# Patient Record
Sex: MALE | Race: WHITE
[De-identification: names, ages, dates, MRNs, and addresses within clinical notes are randomized per-mention and may not be internally consistent; named-entity substitution may affect disease eponyms.]

---

## 2018-01-19 ENCOUNTER — HOSPITAL ENCOUNTER (INPATIENT)
Dept: HOSPITAL 82 - MS2 | Age: 68
LOS: 2 days | Discharge: HOME | DRG: 202 | End: 2018-01-21
Attending: INTERNAL MEDICINE | Admitting: INTERNAL MEDICINE
Payer: COMMERCIAL

## 2018-01-19 VITALS — BODY MASS INDEX: 44.1 KG/M2 | WEIGHT: 315 LBS | HEIGHT: 71 IN

## 2018-01-19 VITALS — DIASTOLIC BLOOD PRESSURE: 87 MMHG | SYSTOLIC BLOOD PRESSURE: 154 MMHG

## 2018-01-19 VITALS — SYSTOLIC BLOOD PRESSURE: 156 MMHG | DIASTOLIC BLOOD PRESSURE: 81 MMHG

## 2018-01-19 DIAGNOSIS — M19.90: ICD-10-CM

## 2018-01-19 DIAGNOSIS — J20.8: Primary | ICD-10-CM

## 2018-01-19 DIAGNOSIS — E11.9: ICD-10-CM

## 2018-01-19 DIAGNOSIS — G89.29: ICD-10-CM

## 2018-01-19 DIAGNOSIS — I10: ICD-10-CM

## 2018-01-19 DIAGNOSIS — K21.9: ICD-10-CM

## 2018-01-19 DIAGNOSIS — D69.3: ICD-10-CM

## 2018-01-19 DIAGNOSIS — Z98.84: ICD-10-CM

## 2018-01-19 DIAGNOSIS — E66.01: ICD-10-CM

## 2018-01-19 DIAGNOSIS — I48.0: ICD-10-CM

## 2018-01-19 DIAGNOSIS — I25.10: ICD-10-CM

## 2018-01-19 DIAGNOSIS — F19.20: ICD-10-CM

## 2018-01-19 LAB
ALBUMIN SERPL-MCNC: 3.2 G/DL (ref 3.2–5)
ALP SERPL-CCNC: 67 U/L (ref 38–126)
ALT SERPL-CCNC: 29 U/L (ref 11–66)
ANION GAP SERPL CALCULATED.3IONS-SCNC: 15 MMOL/L
AST SERPL-CCNC: 25 U/L (ref 19–48)
BASOPHILS NFR BLD AUTO: 0 % (ref 0–3)
BILIRUB UR QL STRIP.AUTO: NEGATIVE
BUN SERPL-MCNC: 19 MG/DL (ref 8–23)
BUN/CREAT SERPL: 21
CALCIUM SERPL-MCNC: 9.2 MG/DL (ref 8.4–10.2)
CHLORIDE SERPL-SCNC: 106 MMOL/L (ref 95–108)
CLARITY UR: CLEAR
CO2 SERPL-SCNC: 24 MMOL/L (ref 22–30)
COLOR UR AUTO: YELLOW
CREAT SERPL-MCNC: 0.9 MG/DL (ref 0.7–1.3)
EOSINOPHIL NFR BLD AUTO: 6 % (ref 0–8)
ERYTHROCYTE [DISTWIDTH] IN BLOOD BY AUTOMATED COUNT: 13.2 % (ref 11.5–15.5)
GLUCOSE SERPL-MCNC: 273 MG/DL (ref 82–115)
GLUCOSE UR STRIP.AUTO-MCNC: 500 MG/DL
HCT VFR BLD AUTO: 40.6 % (ref 39–50)
HGB BLD-MCNC: 13.5 G/DL (ref 14–18)
HGB UR QL STRIP.AUTO: NEGATIVE
IMM GRANULOCYTES NFR BLD: 0.6 % (ref 0–1)
KETONES UR STRIP.AUTO-MCNC: NEGATIVE MG/DL
LEUKOCYTE ESTERASE UR QL STRIP.AUTO: NEGATIVE
LYMPHOCYTES NFR BLD: 31 % (ref 15–41)
MCH RBC QN AUTO: 31.2 PG  CALC (ref 26–32)
MCHC RBC AUTO-ENTMCNC: 33.3 G/L CALC (ref 32–36)
MCV RBC AUTO: 93.8 FL  CALC (ref 80–100)
MONOCYTES NFR BLD AUTO: 5 % (ref 2–13)
NEUTROPHILS # BLD AUTO: 2.9 THOU/UL (ref 1.82–7.42)
NEUTROPHILS NFR BLD AUTO: 57 % (ref 42–76)
NITRITE UR QL STRIP.AUTO: NEGATIVE
PH UR STRIP.AUTO: 5 [PH] (ref 4.5–8)
PLATELET # BLD AUTO: 70 THOU/UL (ref 130–400)
POTASSIUM SERPL-SCNC: 4.7 MMOL/L (ref 3.5–5.1)
PROT SERPL-MCNC: 6 G/DL (ref 6.3–8.2)
PROT UR QL STRIP.AUTO: NEGATIVE MG/DL
RBC # BLD AUTO: 4.33 MILL/UL (ref 4.7–6.1)
SODIUM SERPL-SCNC: 139 MMOL/L (ref 137–146)
SP GR UR STRIP.AUTO: 1.01
UROBILINOGEN UR QL STRIP.AUTO: 0.2 E.U./DL

## 2018-01-20 VITALS — DIASTOLIC BLOOD PRESSURE: 92 MMHG | SYSTOLIC BLOOD PRESSURE: 160 MMHG

## 2018-01-20 VITALS — DIASTOLIC BLOOD PRESSURE: 76 MMHG | SYSTOLIC BLOOD PRESSURE: 149 MMHG

## 2018-01-20 VITALS — DIASTOLIC BLOOD PRESSURE: 63 MMHG | SYSTOLIC BLOOD PRESSURE: 120 MMHG

## 2018-01-20 VITALS — DIASTOLIC BLOOD PRESSURE: 79 MMHG | SYSTOLIC BLOOD PRESSURE: 143 MMHG

## 2018-01-20 VITALS — SYSTOLIC BLOOD PRESSURE: 128 MMHG | DIASTOLIC BLOOD PRESSURE: 63 MMHG

## 2018-01-20 LAB
CHOLEST SERPL-MCNC: 145 MG/DL (ref 0–199)
CHOLEST/HDLC SERPL: 3.7 {RATIO}
HDLC SERPL-MCNC: 39 MG/DL (ref 40–?)
LDLC SERPL CALC-MCNC: 93 MG/DL
TRIGL SERPL-MCNC: 62 MG/DL (ref 30–149)
VLDLC SERPL CALC-MCNC: 12 MG/DL

## 2018-01-21 VITALS — DIASTOLIC BLOOD PRESSURE: 55 MMHG | SYSTOLIC BLOOD PRESSURE: 127 MMHG

## 2018-01-21 VITALS — DIASTOLIC BLOOD PRESSURE: 58 MMHG | SYSTOLIC BLOOD PRESSURE: 137 MMHG

## 2018-01-21 LAB
ALBUMIN SERPL-MCNC: 3.3 G/DL (ref 3.2–5)
ALP SERPL-CCNC: 70 U/L (ref 38–126)
ALT SERPL-CCNC: 23 U/L (ref 11–66)
ANION GAP SERPL CALCULATED.3IONS-SCNC: 17 MMOL/L
AST SERPL-CCNC: 17 U/L (ref 19–48)
BASOPHILS NFR BLD AUTO: 0 % (ref 0–3)
BUN SERPL-MCNC: 27 MG/DL (ref 8–23)
BUN/CREAT SERPL: 25
CALCIUM SERPL-MCNC: 9.8 MG/DL (ref 8.4–10.2)
CHLORIDE SERPL-SCNC: 107 MMOL/L (ref 95–108)
CO2 SERPL-SCNC: 22 MMOL/L (ref 22–30)
CREAT SERPL-MCNC: 1.1 MG/DL (ref 0.7–1.3)
EOSINOPHIL NFR BLD AUTO: 0 % (ref 0–8)
ERYTHROCYTE [DISTWIDTH] IN BLOOD BY AUTOMATED COUNT: 13.4 % (ref 11.5–15.5)
GLUCOSE SERPL-MCNC: 323 MG/DL (ref 82–115)
HCT VFR BLD AUTO: 39.7 % (ref 39–50)
HGB BLD-MCNC: 13.6 G/DL (ref 14–18)
IMM GRANULOCYTES NFR BLD: 1 % (ref 0–1)
LYMPHOCYTES NFR BLD: 6 % (ref 15–41)
MCH RBC QN AUTO: 31.9 PG  CALC (ref 26–32)
MCHC RBC AUTO-ENTMCNC: 34.3 G/L CALC (ref 32–36)
MCV RBC AUTO: 93 FL  CALC (ref 80–100)
MONOCYTES NFR BLD AUTO: 3 % (ref 2–13)
NEUTROPHILS # BLD AUTO: 9.46 THOU/UL (ref 1.82–7.42)
NEUTROPHILS NFR BLD AUTO: 90 % (ref 42–76)
PLATELET # BLD AUTO: 94 THOU/UL (ref 130–400)
POTASSIUM SERPL-SCNC: 4.6 MMOL/L (ref 3.5–5.1)
PROT SERPL-MCNC: 6 G/DL (ref 6.3–8.2)
RBC # BLD AUTO: 4.27 MILL/UL (ref 4.7–6.1)
SODIUM SERPL-SCNC: 141 MMOL/L (ref 137–146)

## 2018-03-05 ENCOUNTER — HOSPITAL ENCOUNTER (OUTPATIENT)
Dept: HOSPITAL 82 - ED | Age: 68
Setting detail: OBSERVATION
Discharge: NURSING, REHAB OR LONG-TERM CARE | DRG: 309 | End: 2018-03-05
Attending: INTERNAL MEDICINE | Admitting: INTERNAL MEDICINE
Payer: COMMERCIAL

## 2018-03-05 VITALS — SYSTOLIC BLOOD PRESSURE: 117 MMHG | DIASTOLIC BLOOD PRESSURE: 56 MMHG

## 2018-03-05 VITALS — DIASTOLIC BLOOD PRESSURE: 130 MMHG | SYSTOLIC BLOOD PRESSURE: 180 MMHG

## 2018-03-05 VITALS — SYSTOLIC BLOOD PRESSURE: 116 MMHG | DIASTOLIC BLOOD PRESSURE: 60 MMHG

## 2018-03-05 VITALS — DIASTOLIC BLOOD PRESSURE: 77 MMHG | SYSTOLIC BLOOD PRESSURE: 106 MMHG

## 2018-03-05 VITALS — SYSTOLIC BLOOD PRESSURE: 139 MMHG | DIASTOLIC BLOOD PRESSURE: 82 MMHG

## 2018-03-05 VITALS — DIASTOLIC BLOOD PRESSURE: 57 MMHG | SYSTOLIC BLOOD PRESSURE: 151 MMHG

## 2018-03-05 VITALS — BODY MASS INDEX: 44.1 KG/M2 | WEIGHT: 315 LBS | HEIGHT: 71 IN

## 2018-03-05 VITALS — DIASTOLIC BLOOD PRESSURE: 75 MMHG | SYSTOLIC BLOOD PRESSURE: 140 MMHG

## 2018-03-05 VITALS — DIASTOLIC BLOOD PRESSURE: 57 MMHG | SYSTOLIC BLOOD PRESSURE: 101 MMHG

## 2018-03-05 VITALS — DIASTOLIC BLOOD PRESSURE: 74 MMHG | SYSTOLIC BLOOD PRESSURE: 146 MMHG

## 2018-03-05 VITALS — DIASTOLIC BLOOD PRESSURE: 71 MMHG | SYSTOLIC BLOOD PRESSURE: 125 MMHG

## 2018-03-05 DIAGNOSIS — E11.9: ICD-10-CM

## 2018-03-05 DIAGNOSIS — I10: ICD-10-CM

## 2018-03-05 DIAGNOSIS — I44.2: Primary | ICD-10-CM

## 2018-03-05 DIAGNOSIS — M10.9: ICD-10-CM

## 2018-03-05 DIAGNOSIS — F19.20: ICD-10-CM

## 2018-03-05 DIAGNOSIS — K21.9: ICD-10-CM

## 2018-03-05 DIAGNOSIS — I24.9: ICD-10-CM

## 2018-03-05 DIAGNOSIS — E66.01: ICD-10-CM

## 2018-03-05 DIAGNOSIS — F41.1: ICD-10-CM

## 2018-03-05 DIAGNOSIS — G89.29: ICD-10-CM

## 2018-03-05 DIAGNOSIS — M19.90: ICD-10-CM

## 2018-03-05 DIAGNOSIS — D69.3: ICD-10-CM

## 2018-03-05 DIAGNOSIS — I48.0: ICD-10-CM

## 2018-03-05 DIAGNOSIS — I25.10: ICD-10-CM

## 2018-03-05 DIAGNOSIS — Z98.84: ICD-10-CM

## 2018-03-05 LAB
ANION GAP SERPL CALCULATED.3IONS-SCNC: 18 MMOL/L
BASOPHILS NFR BLD AUTO: 1 % (ref 0–3)
BILIRUB UR QL STRIP.AUTO: NEGATIVE
BUN SERPL-MCNC: 18 MG/DL (ref 8–23)
BUN/CREAT SERPL: 16
CHLORIDE SERPL-SCNC: 105 MMOL/L (ref 95–108)
CLARITY UR: CLEAR
CO2 SERPL-SCNC: 23 MMOL/L (ref 22–30)
COLOR UR AUTO: YELLOW
CREAT SERPL-MCNC: 1.2 MG/DL (ref 0.7–1.3)
EOSINOPHIL NFR BLD AUTO: 1 % (ref 0–8)
ERYTHROCYTE [DISTWIDTH] IN BLOOD BY AUTOMATED COUNT: 12.6 % (ref 11.5–15.5)
GLUCOSE UR STRIP.AUTO-MCNC: NEGATIVE MG/DL
HCT VFR BLD AUTO: 47.7 % (ref 39–50)
HGB BLD-MCNC: 16.5 G/DL (ref 14–18)
HGB UR QL STRIP.AUTO: (no result)
IMM GRANULOCYTES NFR BLD: 0.6 % (ref 0–1)
KETONES UR STRIP.AUTO-MCNC: (no result) MG/DL
LEUKOCYTE ESTERASE UR QL STRIP.AUTO: NEGATIVE
LYMPHOCYTES NFR BLD: 15 % (ref 15–41)
MCH RBC QN AUTO: 32.5 PG  CALC (ref 26–32)
MCHC RBC AUTO-ENTMCNC: 34.6 G/L CALC (ref 32–36)
MCV RBC AUTO: 93.9 FL  CALC (ref 80–100)
MONOCYTES NFR BLD AUTO: 5 % (ref 2–13)
NEUTROPHILS # BLD AUTO: 7.39 THOU/UL (ref 1.82–7.42)
NEUTROPHILS NFR BLD AUTO: 78 % (ref 42–76)
NITRITE UR QL STRIP.AUTO: NEGATIVE
PH UR STRIP.AUTO: 6 [PH] (ref 4.5–8)
PLATELET # BLD AUTO: 102 THOU/UL (ref 130–400)
POTASSIUM SERPL-SCNC: 4.3 MMOL/L (ref 3.5–5.1)
PROT UR QL STRIP.AUTO: (no result) MG/DL
RBC # BLD AUTO: 5.08 MILL/UL (ref 4.7–6.1)
SODIUM SERPL-SCNC: 141 MMOL/L (ref 137–146)
SP GR UR STRIP.AUTO: <=1.005
UROBILINOGEN UR QL STRIP.AUTO: 0.2 E.U./DL

## 2018-03-05 PROCEDURE — G0378 HOSPITAL OBSERVATION PER HR: HCPCS

## 2018-03-05 NOTE — NUR
Dr Akins called this writer; orders received to initiate transfer to [facility] with
Dr Berumen's as accepting physician

## 2018-03-05 NOTE — NUR
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
I'm a retired PA"; meal provided as per request

## 2018-03-05 NOTE — NUR
call received from TRUONG Jimenez; bed assignment received; pt to transfer to
ICU bed 249; report to be called to [phone]

## 2018-03-05 NOTE — NUR
NO CHANGES TO RHYTHM AFTER ADMINISTRATION OF ATROPINE 0.5 MG.  REMAINS IN
COMPLETE HEART BLOCK WITH RATE FROM 36-48.  PT. CONTINUES TO DENY SYMPTOMS.

## 2018-03-05 NOTE — NUR
REPORT CALLED TO BREE NURSE, ON MEDSUR. PT ON CARDIAC MONITOR TO [facility]. IV
SITE HEALTHY. TRANSPORTED VIA STRETCHER.

## 2018-03-05 NOTE — NUR
PT ARRIVED FROM ER VIA STRETCHER ACCOMPANIED BY STAFF. IV SITE IS FREE FROM
REDNESS , TELE MONTIOR IN PLACE. NO DISTRESS NOTED. CONTINUE TO OBSERVE AND
MONITOR.

## 2018-03-05 NOTE — NUR
PT. BACK INTO COMPLETE HEART BLOCK AT THIS TIME.  PT. DENIES SYMPTOMS.
PROVIDED WITH URINAL AND WATER AT THIS TIME.  IV FLUIDS CONTINUE TO INFUSE
/HR.

## 2018-03-05 NOTE — NUR
[facility] Barbara Hwang called per LEANNE Messer RN;
transfer info provided; Eri request facesheet and transfer order to be
faxed to [phone];

## 2018-03-05 NOTE — NUR
male pt received to ICU bed 1 from med/surg s/p clinical support via bed in
stable condition; monitoring attachments explained and connected; assessment
completed at this time; pt alert and oriented; denies pain/chest pain or
dizziness; pt admits to feeling "fine"; resp even and unlabored; lungs clear
bilat; skin color wnl; ra; hr irreg; strong pulses; trace edema noted to ble;
hr 40s on monitor; abd soft/distended with bs present; pt admits to last bm
3/4/18; admits to voiding without complication/pain; no urine to inspect at
this time; urinal at bedside; #20 flushed and patent to lac; blood return
noted; staff attempt/encouraged second iv access with pt refusal; pt states
"I'll take my chances of not having it"; plan of care explained including
transfer to [facility]; call light within reach; will continue to monitor

## 2018-03-05 NOTE — NUR
ASSESS,ENT IS COMPLTED: PT HRT RATE WENT TO 38-45 WHILE TALKING WITH PT
TOTALLY ASYMPTOMATIC. BREATH SOUNDS ARE CLEAR. NO C/O SOB, AT THIS TIME. HR IS
REG, PULSES ARE STRONG X4. BP /57. CONTINUE TO OBSERVE AND MONITOR.

## 2018-03-05 NOTE — NUR
HEARD OVERHEAD PAGE OF CLINICAL SUPPORT TEAM TO ROOM 282. ATTEMPTED TO NOTIFY
 HE WAS ENROUTE TO THE HOSPITAL FROM THE OFFICE.  IN THE ROOM.
EKG BEING PERFORMED. DR. OSORIO ARRIVED AND ORDERED MEDICATIONS AT 1740
. THEN WANTED PT TRANSFERRED TO ICU.

## 2018-03-05 NOTE — NUR
Dr Akins at bedside; orders received to cancel transfer to [facility] and pt is to
transfer to [facility] under services of Dr Trevon Christianson; [facility] called per LEANNE Messer RN; [facility] informed to cancel transfer

## 2018-03-05 NOTE — NUR
PT TRANSFERRED TO ICU VIA BED WITH ALL BELONGINGS STARTED TO GIVE REPORT TO
NURSE AND THEN WENT TO GIVE BEDSIDE REPORT. IV SITE REMAINS INTACT.

## 2018-03-05 NOTE — NUR
PT. LEAVES VIA [facility] AT THIS TIME.  REMAINS IN COMPLETE HEART BLOCK.
DENIES SYMPTOMS AT THIS TIME.  HR REMAINS IN THE 40'S.

## 2018-03-05 NOTE — NUR
MD MADE AWARE OF PT. STATUS.  NEW ORDERS RECEIVED.  WILL MEDICATE AS ORDERED
WHEN PHARMACY PROFILES.

## 2019-02-14 ENCOUNTER — HOSPITAL ENCOUNTER (OUTPATIENT)
Dept: HOSPITAL 82 - CT | Age: 69
Discharge: HOME | DRG: 694 | End: 2019-02-14
Attending: INTERNAL MEDICINE
Payer: COMMERCIAL

## 2019-02-14 DIAGNOSIS — N23: Primary | ICD-10-CM

## 2019-02-14 DIAGNOSIS — N43.2: ICD-10-CM

## 2019-02-14 DIAGNOSIS — N20.0: ICD-10-CM

## 2019-06-13 ENCOUNTER — HOSPITAL ENCOUNTER (OUTPATIENT)
Dept: HOSPITAL 82 - ORM | Age: 69
Discharge: HOME | DRG: 711 | End: 2019-06-13
Attending: UROLOGY
Payer: COMMERCIAL

## 2019-06-13 VITALS — BODY MASS INDEX: 44.1 KG/M2 | WEIGHT: 315 LBS | HEIGHT: 71 IN

## 2019-06-13 VITALS — DIASTOLIC BLOOD PRESSURE: 59 MMHG | SYSTOLIC BLOOD PRESSURE: 111 MMHG

## 2019-06-13 DIAGNOSIS — I25.10: ICD-10-CM

## 2019-06-13 DIAGNOSIS — N32.89: ICD-10-CM

## 2019-06-13 DIAGNOSIS — R39.12: ICD-10-CM

## 2019-06-13 DIAGNOSIS — R35.1: ICD-10-CM

## 2019-06-13 DIAGNOSIS — Z87.442: ICD-10-CM

## 2019-06-13 DIAGNOSIS — I10: ICD-10-CM

## 2019-06-13 DIAGNOSIS — N40.1: Primary | ICD-10-CM

## 2019-06-13 DIAGNOSIS — E66.01: ICD-10-CM

## 2019-06-13 DIAGNOSIS — D69.3: ICD-10-CM

## 2019-06-13 DIAGNOSIS — N43.3: ICD-10-CM

## 2019-06-13 DIAGNOSIS — G89.29: ICD-10-CM

## 2019-06-13 LAB
ALBUMIN SERPL-MCNC: 3.5 G/DL (ref 3.2–5)
ALP SERPL-CCNC: 64 U/L (ref 38–126)
ANION GAP SERPL CALCULATED.3IONS-SCNC: 13 MMOL/L
AST SERPL-CCNC: 16 U/L (ref 19–48)
BASOPHILS NFR BLD AUTO: 1 % (ref 0–3)
BUN SERPL-MCNC: 16 MG/DL (ref 8–23)
BUN/CREAT SERPL: 16
CHLORIDE SERPL-SCNC: 108 MMOL/L (ref 95–108)
CO2 SERPL-SCNC: 27 MMOL/L (ref 22–30)
CREAT SERPL-MCNC: 1 MG/DL (ref 0.7–1.3)
EOSINOPHIL NFR BLD AUTO: 3 % (ref 0–8)
ERYTHROCYTE [DISTWIDTH] IN BLOOD BY AUTOMATED COUNT: 12.5 % (ref 11.5–15.5)
HCT VFR BLD AUTO: 43.1 % (ref 39–50)
HGB BLD-MCNC: 14.3 G/DL (ref 14–18)
IMM GRANULOCYTES NFR BLD: 0.9 % (ref 0–5)
LYMPHOCYTES NFR BLD: 19 % (ref 15–41)
MCH RBC QN AUTO: 31.5 PG  CALC (ref 26–32)
MCHC RBC AUTO-ENTMCNC: 33.2 G/L CALC (ref 32–36)
MCV RBC AUTO: 94.9 FL  CALC (ref 80–100)
MONOCYTES NFR BLD AUTO: 6 % (ref 2–13)
NEUTROPHILS # BLD AUTO: 4.81 THOU/UL (ref 1.82–7.42)
NEUTROPHILS NFR BLD AUTO: 71 % (ref 42–76)
PLATELET # BLD AUTO: 88 THOU/UL (ref 130–400)
POTASSIUM SERPL-SCNC: 4.6 MMOL/L (ref 3.5–5.1)
PROT SERPL-MCNC: 6.1 G/DL (ref 6.3–8.2)
RBC # BLD AUTO: 4.54 MILL/UL (ref 4.7–6.1)
SODIUM SERPL-SCNC: 143 MMOL/L (ref 137–146)

## 2019-06-13 PROCEDURE — 0VBG0ZZ EXCISION OF LEFT SPERMATIC CORD, OPEN APPROACH: ICD-10-PCS | Performed by: UROLOGY

## 2019-06-13 PROCEDURE — 0T7D8DZ DILATION OF URETHRA WITH INTRALUMINAL DEVICE, VIA NATURAL OR ARTIFICIAL OPENING ENDOSCOPIC: ICD-10-PCS | Performed by: UROLOGY

## 2019-06-17 ENCOUNTER — HOSPITAL ENCOUNTER (OUTPATIENT)
Dept: HOSPITAL 82 - MS2 | Age: 69
Setting detail: OBSERVATION
LOS: 2 days | Discharge: HOME | DRG: 920 | End: 2019-06-19
Attending: INTERNAL MEDICINE | Admitting: INTERNAL MEDICINE
Payer: COMMERCIAL

## 2019-06-17 VITALS — HEIGHT: 71 IN | BODY MASS INDEX: 44.1 KG/M2 | WEIGHT: 315 LBS

## 2019-06-17 VITALS — DIASTOLIC BLOOD PRESSURE: 54 MMHG | SYSTOLIC BLOOD PRESSURE: 153 MMHG

## 2019-06-17 VITALS — DIASTOLIC BLOOD PRESSURE: 80 MMHG | SYSTOLIC BLOOD PRESSURE: 120 MMHG

## 2019-06-17 DIAGNOSIS — I10: ICD-10-CM

## 2019-06-17 DIAGNOSIS — D69.3: ICD-10-CM

## 2019-06-17 DIAGNOSIS — M19.90: ICD-10-CM

## 2019-06-17 DIAGNOSIS — K21.9: ICD-10-CM

## 2019-06-17 DIAGNOSIS — Z87.442: ICD-10-CM

## 2019-06-17 DIAGNOSIS — I44.2: ICD-10-CM

## 2019-06-17 DIAGNOSIS — N99.840: Primary | ICD-10-CM

## 2019-06-17 DIAGNOSIS — R39.12: ICD-10-CM

## 2019-06-17 DIAGNOSIS — N40.1: ICD-10-CM

## 2019-06-17 DIAGNOSIS — G89.29: ICD-10-CM

## 2019-06-17 DIAGNOSIS — N43.3: ICD-10-CM

## 2019-06-17 DIAGNOSIS — R35.1: ICD-10-CM
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
## 2019-06-17 DIAGNOSIS — F19.20: ICD-10-CM

## 2019-06-17 DIAGNOSIS — I25.10: ICD-10-CM

## 2019-06-17 DIAGNOSIS — E66.01: ICD-10-CM

## 2019-06-17 DIAGNOSIS — I48.0: ICD-10-CM

## 2019-06-17 DIAGNOSIS — E11.9: ICD-10-CM

## 2019-06-17 DIAGNOSIS — Z95.0: ICD-10-CM

## 2019-06-17 DIAGNOSIS — E78.5: ICD-10-CM

## 2019-06-17 LAB
ANION GAP SERPL CALCULATED.3IONS-SCNC: 11 MMOL/L
BASOPHILS NFR BLD AUTO: 0 % (ref 0–3)
BILIRUB UR QL STRIP.AUTO: NEGATIVE
BUN SERPL-MCNC: 18 MG/DL (ref 8–23)
BUN/CREAT SERPL: 16
CHLORIDE SERPL-SCNC: 104 MMOL/L (ref 95–108)
CO2 SERPL-SCNC: 28 MMOL/L (ref 22–30)
COLOR UR AUTO: (no result)
CREAT SERPL-MCNC: 1.1 MG/DL (ref 0.7–1.3)
EOSINOPHIL NFR BLD AUTO: 3 % (ref 0–8)
ERYTHROCYTE [DISTWIDTH] IN BLOOD BY AUTOMATED COUNT: 12.3 % (ref 11.5–15.5)
GLUCOSE UR STRIP.AUTO-MCNC: NEGATIVE MG/DL
HCT VFR BLD AUTO: 33.7 % (ref 39–50)
HGB BLD-MCNC: 11.3 G/DL (ref 14–18)
HGB UR QL STRIP.AUTO: (no result)
IMM GRANULOCYTES NFR BLD: 0.6 % (ref 0–5)
KETONES UR STRIP.AUTO-MCNC: (no result) MG/DL
LEUKOCYTE ESTERASE UR QL STRIP.AUTO: NEGATIVE
LYMPHOCYTES NFR BLD: 20 % (ref 15–41)
MCH RBC QN AUTO: 32.1 PG  CALC (ref 26–32)
MCHC RBC AUTO-ENTMCNC: 33.5 G/L CALC (ref 32–36)
MCV RBC AUTO: 95.7 FL  CALC (ref 80–100)
MONOCYTES NFR BLD AUTO: 8 % (ref 2–13)
NEUTROPHILS # BLD AUTO: 4.88 THOU/UL (ref 1.82–7.42)
NEUTROPHILS NFR BLD AUTO: 68 % (ref 42–76)
NITRITE UR QL STRIP.AUTO: POSITIVE
PH UR STRIP.AUTO: 5 [PH] (ref 4.5–8)
PLATELET # BLD AUTO: 99 THOU/UL (ref 130–400)
POTASSIUM SERPL-SCNC: 4.5 MMOL/L (ref 3.5–5.1)
PROT UR QL STRIP.AUTO: 100 MG/DL
RBC # BLD AUTO: 3.52 MILL/UL (ref 4.7–6.1)
RBC #/AREA URNS HPF: (no result) RBC/HPF (ref 0–5)
SODIUM SERPL-SCNC: 140 MMOL/L (ref 137–146)
SP GR UR STRIP.AUTO: >=1.03
UROBILINOGEN UR QL STRIP.AUTO: 2 E.U./DL
WBC #/AREA URNS HPF: (no result) WBC/HPF (ref 0–5)

## 2019-06-17 PROCEDURE — G0379 DIRECT REFER HOSPITAL OBSERV: HCPCS

## 2019-06-17 PROCEDURE — G0378 HOSPITAL OBSERVATION PER HR: HCPCS

## 2019-06-18 VITALS — SYSTOLIC BLOOD PRESSURE: 122 MMHG | DIASTOLIC BLOOD PRESSURE: 80 MMHG

## 2019-06-18 VITALS — SYSTOLIC BLOOD PRESSURE: 117 MMHG | DIASTOLIC BLOOD PRESSURE: 41 MMHG

## 2019-06-18 VITALS — SYSTOLIC BLOOD PRESSURE: 132 MMHG | DIASTOLIC BLOOD PRESSURE: 74 MMHG

## 2019-06-18 VITALS — SYSTOLIC BLOOD PRESSURE: 127 MMHG | DIASTOLIC BLOOD PRESSURE: 83 MMHG

## 2019-06-18 LAB
ALBUMIN SERPL-MCNC: 3 G/DL (ref 3.2–5)
ALP SERPL-CCNC: 64 U/L (ref 38–126)
ANION GAP SERPL CALCULATED.3IONS-SCNC: 11 MMOL/L
AST SERPL-CCNC: 14 U/L (ref 19–48)
BASOPHILS NFR BLD AUTO: 1 % (ref 0–3)
BUN SERPL-MCNC: 18 MG/DL (ref 8–23)
BUN/CREAT SERPL: 16
CHLORIDE SERPL-SCNC: 105 MMOL/L (ref 95–108)
CO2 SERPL-SCNC: 27 MMOL/L (ref 22–30)
CREAT SERPL-MCNC: 1.1 MG/DL (ref 0.7–1.3)
EOSINOPHIL NFR BLD AUTO: 4 % (ref 0–8)
ERYTHROCYTE [DISTWIDTH] IN BLOOD BY AUTOMATED COUNT: 12.3 % (ref 11.5–15.5)
HCT VFR BLD AUTO: 33.7 % (ref 39–50)
HGB BLD-MCNC: 11.1 G/DL (ref 14–18)
IMM GRANULOCYTES NFR BLD: 0.6 % (ref 0–5)
LYMPHOCYTES NFR BLD: 21 % (ref 15–41)
MCH RBC QN AUTO: 31.7 PG  CALC (ref 26–32)
MCHC RBC AUTO-ENTMCNC: 32.9 G/L CALC (ref 32–36)
MCV RBC AUTO: 96.3 FL  CALC (ref 80–100)
MONOCYTES NFR BLD AUTO: 7 % (ref 2–13)
NEUTROPHILS # BLD AUTO: 4.64 THOU/UL (ref 1.82–7.42)
NEUTROPHILS NFR BLD AUTO: 66 % (ref 42–76)
PLATELET # BLD AUTO: 95 THOU/UL (ref 130–400)
POTASSIUM SERPL-SCNC: 4.3 MMOL/L (ref 3.5–5.1)
PROT SERPL-MCNC: 5.4 G/DL (ref 6.3–8.2)
RBC # BLD AUTO: 3.5 MILL/UL (ref 4.7–6.1)
SODIUM SERPL-SCNC: 139 MMOL/L (ref 137–146)

## 2019-06-19 VITALS — DIASTOLIC BLOOD PRESSURE: 96 MMHG | SYSTOLIC BLOOD PRESSURE: 181 MMHG

## 2019-06-19 VITALS — DIASTOLIC BLOOD PRESSURE: 74 MMHG | SYSTOLIC BLOOD PRESSURE: 160 MMHG

## 2019-06-19 VITALS — DIASTOLIC BLOOD PRESSURE: 79 MMHG | SYSTOLIC BLOOD PRESSURE: 126 MMHG

## 2019-06-19 LAB
ALBUMIN SERPL-MCNC: 2.9 G/DL (ref 3.2–5)
ALP SERPL-CCNC: 63 U/L (ref 38–126)
ANION GAP SERPL CALCULATED.3IONS-SCNC: 10 MMOL/L
AST SERPL-CCNC: 17 U/L (ref 19–48)
BASOPHILS NFR BLD AUTO: 1 % (ref 0–3)
BUN SERPL-MCNC: 13 MG/DL (ref 8–23)
BUN/CREAT SERPL: 13
CHLORIDE SERPL-SCNC: 107 MMOL/L (ref 95–108)
CO2 SERPL-SCNC: 26 MMOL/L (ref 22–30)
CREAT SERPL-MCNC: 1 MG/DL (ref 0.7–1.3)
EOSINOPHIL NFR BLD AUTO: 4 % (ref 0–8)
ERYTHROCYTE [DISTWIDTH] IN BLOOD BY AUTOMATED COUNT: 12.4 % (ref 11.5–15.5)
HCT VFR BLD AUTO: 34.3 % (ref 39–50)
HGB BLD-MCNC: 11.3 G/DL (ref 14–18)
IMM GRANULOCYTES NFR BLD: 0.5 % (ref 0–5)
LYMPHOCYTES NFR BLD: 24 % (ref 15–41)
MCH RBC QN AUTO: 31.9 PG  CALC (ref 26–32)
MCHC RBC AUTO-ENTMCNC: 32.9 G/L CALC (ref 32–36)
MCV RBC AUTO: 96.9 FL  CALC (ref 80–100)
MONOCYTES NFR BLD AUTO: 9 % (ref 2–13)
NEUTROPHILS # BLD AUTO: 3.98 THOU/UL (ref 1.82–7.42)
NEUTROPHILS NFR BLD AUTO: 62 % (ref 42–76)
PLATELET # BLD AUTO: 92 THOU/UL (ref 130–400)
POTASSIUM SERPL-SCNC: 4.1 MMOL/L (ref 3.5–5.1)
PROT SERPL-MCNC: 5.4 G/DL (ref 6.3–8.2)
RBC # BLD AUTO: 3.54 MILL/UL (ref 4.7–6.1)
SODIUM SERPL-SCNC: 140 MMOL/L (ref 137–146)

## 2019-07-08 ENCOUNTER — HOSPITAL ENCOUNTER (EMERGENCY)
Dept: HOSPITAL 82 - ED | Age: 69
Discharge: HOME | DRG: 730 | End: 2019-07-08
Payer: COMMERCIAL

## 2019-07-08 VITALS — SYSTOLIC BLOOD PRESSURE: 147 MMHG | DIASTOLIC BLOOD PRESSURE: 71 MMHG

## 2019-07-08 VITALS — WEIGHT: 315 LBS | BODY MASS INDEX: 44.1 KG/M2 | HEIGHT: 71 IN

## 2019-07-08 DIAGNOSIS — N45.1: ICD-10-CM

## 2019-07-08 DIAGNOSIS — N50.89: Primary | ICD-10-CM

## 2019-07-08 DIAGNOSIS — Z98.890: ICD-10-CM

## 2019-07-08 LAB
ALBUMIN SERPL-MCNC: 3.7 G/DL (ref 3.2–5)
ALP SERPL-CCNC: 98 U/L (ref 38–126)
ANION GAP SERPL CALCULATED.3IONS-SCNC: 13 MMOL/L
AST SERPL-CCNC: 19 U/L (ref 19–48)
BACTERIA #/AREA URNS HPF: (no result) HPF
BASOPHILS NFR BLD AUTO: 1 % (ref 0–3)
BILIRUB UR QL STRIP.AUTO: NEGATIVE
BUN SERPL-MCNC: 18 MG/DL (ref 8–23)
BUN/CREAT SERPL: 18
CHLORIDE SERPL-SCNC: 106 MMOL/L (ref 95–108)
CO2 SERPL-SCNC: 28 MMOL/L (ref 22–30)
COLOR UR AUTO: YELLOW
CREAT SERPL-MCNC: 1 MG/DL (ref 0.7–1.3)
EOSINOPHIL NFR BLD AUTO: 3 % (ref 0–8)
ERYTHROCYTE [DISTWIDTH] IN BLOOD BY AUTOMATED COUNT: 12.1 % (ref 11.5–15.5)
GLUCOSE UR STRIP.AUTO-MCNC: NEGATIVE MG/DL
HCT VFR BLD AUTO: 40.4 % (ref 39–50)
HGB BLD-MCNC: 13.2 G/DL (ref 14–18)
HGB UR QL STRIP.AUTO: (no result)
IMM GRANULOCYTES NFR BLD: 0.7 % (ref 0–5)
KETONES UR STRIP.AUTO-MCNC: NEGATIVE MG/DL
LEUKOCYTE ESTERASE UR QL STRIP.AUTO: NEGATIVE
LYMPHOCYTES NFR BLD: 20 % (ref 15–41)
MCH RBC QN AUTO: 31 PG  CALC (ref 26–32)
MCHC RBC AUTO-ENTMCNC: 32.7 G/L CALC (ref 32–36)
MCV RBC AUTO: 94.8 FL  CALC (ref 80–100)
MONOCYTES NFR BLD AUTO: 6 % (ref 2–13)
NEUTROPHILS # BLD AUTO: 6.17 THOU/UL (ref 1.82–7.42)
NEUTROPHILS NFR BLD AUTO: 70 % (ref 42–76)
NITRITE UR QL STRIP.AUTO: POSITIVE
PH UR STRIP.AUTO: 7 [PH] (ref 4.5–8)
PLATELET # BLD AUTO: 111 THOU/UL (ref 130–400)
POTASSIUM SERPL-SCNC: 4.7 MMOL/L (ref 3.5–5.1)
PROT SERPL-MCNC: 6.8 G/DL (ref 6.3–8.2)
PROT UR QL STRIP.AUTO: (no result) MG/DL
RBC # BLD AUTO: 4.26 MILL/UL (ref 4.7–6.1)
RBC #/AREA URNS HPF: (no result) RBC/HPF (ref 0–5)
SODIUM SERPL-SCNC: 142 MMOL/L (ref 137–146)
SP GR UR STRIP.AUTO: 1.01
UROBILINOGEN UR QL STRIP.AUTO: 0.2 E.U./DL

## 2019-07-24 ENCOUNTER — HOSPITAL ENCOUNTER (INPATIENT)
Dept: HOSPITAL 82 - ED | Age: 69
LOS: 8 days | Discharge: HOME HEALTH | DRG: 982 | End: 2019-08-01
Attending: INTERNAL MEDICINE | Admitting: INTERNAL MEDICINE
Payer: COMMERCIAL

## 2019-07-24 VITALS — SYSTOLIC BLOOD PRESSURE: 124 MMHG | DIASTOLIC BLOOD PRESSURE: 68 MMHG

## 2019-07-24 VITALS — WEIGHT: 315 LBS | BODY MASS INDEX: 44.1 KG/M2 | HEIGHT: 71 IN

## 2019-07-24 DIAGNOSIS — E78.5: ICD-10-CM

## 2019-07-24 DIAGNOSIS — Z95.0: ICD-10-CM

## 2019-07-24 DIAGNOSIS — D62: ICD-10-CM

## 2019-07-24 DIAGNOSIS — F19.20: ICD-10-CM
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
## 2019-07-24 DIAGNOSIS — N20.0: ICD-10-CM

## 2019-07-24 DIAGNOSIS — E11.9: ICD-10-CM

## 2019-07-24 DIAGNOSIS — N40.1: ICD-10-CM

## 2019-07-24 DIAGNOSIS — I10: ICD-10-CM

## 2019-07-24 DIAGNOSIS — G89.29: ICD-10-CM

## 2019-07-24 DIAGNOSIS — E66.01: ICD-10-CM

## 2019-07-24 DIAGNOSIS — Z98.84: ICD-10-CM

## 2019-07-24 DIAGNOSIS — R35.1: ICD-10-CM

## 2019-07-24 DIAGNOSIS — N45.1: ICD-10-CM

## 2019-07-24 DIAGNOSIS — B96.4: ICD-10-CM

## 2019-07-24 DIAGNOSIS — I25.10: ICD-10-CM

## 2019-07-24 DIAGNOSIS — N99.840: Primary | ICD-10-CM

## 2019-07-24 DIAGNOSIS — F32.9: ICD-10-CM

## 2019-07-24 DIAGNOSIS — R39.12: ICD-10-CM

## 2019-07-24 DIAGNOSIS — K21.9: ICD-10-CM

## 2019-07-24 DIAGNOSIS — F41.9: ICD-10-CM

## 2019-07-24 DIAGNOSIS — M19.90: ICD-10-CM

## 2019-07-24 DIAGNOSIS — D69.3: ICD-10-CM

## 2019-07-24 DIAGNOSIS — I44.2: ICD-10-CM

## 2019-07-24 DIAGNOSIS — I48.0: ICD-10-CM

## 2019-07-24 DIAGNOSIS — N49.2: ICD-10-CM

## 2019-07-24 DIAGNOSIS — T81.43XA: ICD-10-CM

## 2019-07-24 LAB
ALBUMIN SERPL-MCNC: 4 G/DL (ref 3.2–5)
ALP SERPL-CCNC: 83 U/L (ref 38–126)
ANION GAP SERPL CALCULATED.3IONS-SCNC: 17 MMOL/L
AST SERPL-CCNC: 25 U/L (ref 19–48)
BASOPHILS NFR BLD AUTO: 1 % (ref 0–3)
BILIRUB UR QL STRIP.AUTO: NEGATIVE
BUN SERPL-MCNC: 21 MG/DL (ref 8–23)
BUN/CREAT SERPL: 18
CHLORIDE SERPL-SCNC: 105 MMOL/L (ref 95–108)
CO2 SERPL-SCNC: 22 MMOL/L (ref 22–30)
COLOR UR AUTO: YELLOW
CREAT SERPL-MCNC: 1.2 MG/DL (ref 0.7–1.3)
EOSINOPHIL NFR BLD AUTO: 1 % (ref 0–8)
ERYTHROCYTE [DISTWIDTH] IN BLOOD BY AUTOMATED COUNT: 12.2 % (ref 11.5–15.5)
GLUCOSE UR STRIP.AUTO-MCNC: NEGATIVE MG/DL
HCT VFR BLD AUTO: 41.1 % (ref 39–50)
HGB BLD-MCNC: 13.5 G/DL (ref 14–18)
HGB UR QL STRIP.AUTO: (no result)
IMM GRANULOCYTES NFR BLD: 0.6 % (ref 0–5)
KETONES UR STRIP.AUTO-MCNC: NEGATIVE MG/DL
LEUKOCYTE ESTERASE UR QL STRIP.AUTO: NEGATIVE
LYMPHOCYTES NFR BLD: 10 % (ref 15–41)
MCH RBC QN AUTO: 30.8 PG  CALC (ref 26–32)
MCHC RBC AUTO-ENTMCNC: 32.8 G/L CALC (ref 32–36)
MCV RBC AUTO: 93.6 FL  CALC (ref 80–100)
MONOCYTES NFR BLD AUTO: 9 % (ref 2–13)
NEUTROPHILS # BLD AUTO: 10.56 THOU/UL (ref 1.82–7.42)
NEUTROPHILS NFR BLD AUTO: 79 % (ref 42–76)
NITRITE UR QL STRIP.AUTO: NEGATIVE
PH UR STRIP.AUTO: 5.5 [PH] (ref 4.5–8)
PLATELET # BLD AUTO: 144 THOU/UL (ref 130–400)
POTASSIUM SERPL-SCNC: 4.6 MMOL/L (ref 3.5–5.1)
PROT SERPL-MCNC: 7.6 G/DL (ref 6.3–8.2)
PROT UR QL STRIP.AUTO: 30 MG/DL
RBC # BLD AUTO: 4.39 MILL/UL (ref 4.7–6.1)
RBC #/AREA URNS HPF: (no result) RBC/HPF (ref 0–5)
SODIUM SERPL-SCNC: 139 MMOL/L (ref 137–146)
SP GR UR STRIP.AUTO: >=1.03
UROBILINOGEN UR QL STRIP.AUTO: 0.2 E.U./DL
WBC #/AREA URNS HPF: (no result) WBC/HPF (ref 0–5)

## 2019-07-24 PROCEDURE — G0378 HOSPITAL OBSERVATION PER HR: HCPCS

## 2019-07-24 PROCEDURE — P9016 RBC LEUKOCYTES REDUCED: HCPCS

## 2019-07-25 VITALS — DIASTOLIC BLOOD PRESSURE: 50 MMHG | SYSTOLIC BLOOD PRESSURE: 134 MMHG

## 2019-07-25 VITALS — DIASTOLIC BLOOD PRESSURE: 79 MMHG | SYSTOLIC BLOOD PRESSURE: 165 MMHG

## 2019-07-25 VITALS — SYSTOLIC BLOOD PRESSURE: 127 MMHG | DIASTOLIC BLOOD PRESSURE: 48 MMHG

## 2019-07-25 VITALS — DIASTOLIC BLOOD PRESSURE: 62 MMHG | SYSTOLIC BLOOD PRESSURE: 127 MMHG

## 2019-07-25 VITALS — DIASTOLIC BLOOD PRESSURE: 44 MMHG | SYSTOLIC BLOOD PRESSURE: 102 MMHG

## 2019-07-25 VITALS — SYSTOLIC BLOOD PRESSURE: 149 MMHG | DIASTOLIC BLOOD PRESSURE: 66 MMHG

## 2019-07-25 VITALS — DIASTOLIC BLOOD PRESSURE: 52 MMHG | SYSTOLIC BLOOD PRESSURE: 113 MMHG

## 2019-07-25 VITALS — DIASTOLIC BLOOD PRESSURE: 44 MMHG | SYSTOLIC BLOOD PRESSURE: 125 MMHG

## 2019-07-25 VITALS — DIASTOLIC BLOOD PRESSURE: 71 MMHG | SYSTOLIC BLOOD PRESSURE: 125 MMHG

## 2019-07-25 LAB
ALBUMIN SERPL-MCNC: 2.8 G/DL (ref 3.2–5)
ALP SERPL-CCNC: 75 U/L (ref 38–126)
ANION GAP SERPL CALCULATED.3IONS-SCNC: 11 MMOL/L
AST SERPL-CCNC: 20 U/L (ref 19–48)
BASOPHILS NFR BLD AUTO: 1 % (ref 0–3)
BUN SERPL-MCNC: 19 MG/DL (ref 8–23)
BUN/CREAT SERPL: 20
CHLORIDE SERPL-SCNC: 108 MMOL/L (ref 95–108)
CO2 SERPL-SCNC: 25 MMOL/L (ref 22–30)
CREAT SERPL-MCNC: 1 MG/DL (ref 0.7–1.3)
EOSINOPHIL NFR BLD AUTO: 2 % (ref 0–8)
ERYTHROCYTE [DISTWIDTH] IN BLOOD BY AUTOMATED COUNT: 12.3 % (ref 11.5–15.5)
HCT VFR BLD AUTO: 36 % (ref 39–50)
HGB BLD-MCNC: 11.7 G/DL (ref 14–18)
IMM GRANULOCYTES NFR BLD: 0.6 % (ref 0–5)
LYMPHOCYTES NFR BLD: 22 % (ref 15–41)
MCH RBC QN AUTO: 30.8 PG  CALC (ref 26–32)
MCHC RBC AUTO-ENTMCNC: 32.5 G/L CALC (ref 32–36)
MCV RBC AUTO: 94.7 FL  CALC (ref 80–100)
MONOCYTES NFR BLD AUTO: 9 % (ref 2–13)
NEUTROPHILS # BLD AUTO: 5.33 THOU/UL (ref 1.82–7.42)
NEUTROPHILS NFR BLD AUTO: 65 % (ref 42–76)
PLATELET # BLD AUTO: 121 THOU/UL (ref 130–400)
POTASSIUM SERPL-SCNC: 4.2 MMOL/L (ref 3.5–5.1)
PROT SERPL-MCNC: 5.7 G/DL (ref 6.3–8.2)
RBC # BLD AUTO: 3.8 MILL/UL (ref 4.7–6.1)
SODIUM SERPL-SCNC: 140 MMOL/L (ref 137–146)

## 2019-07-25 PROCEDURE — 0VSB0ZZ REPOSITION LEFT TESTIS, OPEN APPROACH: ICD-10-PCS | Performed by: UROLOGY

## 2019-07-25 PROCEDURE — 0V950ZZ DRAINAGE OF SCROTUM, OPEN APPROACH: ICD-10-PCS | Performed by: UROLOGY

## 2019-07-25 PROCEDURE — 0VB50ZZ EXCISION OF SCROTUM, OPEN APPROACH: ICD-10-PCS | Performed by: UROLOGY

## 2019-07-26 VITALS — SYSTOLIC BLOOD PRESSURE: 111 MMHG | DIASTOLIC BLOOD PRESSURE: 58 MMHG

## 2019-07-26 VITALS — DIASTOLIC BLOOD PRESSURE: 68 MMHG | SYSTOLIC BLOOD PRESSURE: 136 MMHG

## 2019-07-26 VITALS — SYSTOLIC BLOOD PRESSURE: 107 MMHG | DIASTOLIC BLOOD PRESSURE: 56 MMHG

## 2019-07-26 VITALS — DIASTOLIC BLOOD PRESSURE: 63 MMHG | SYSTOLIC BLOOD PRESSURE: 111 MMHG

## 2019-07-26 VITALS — DIASTOLIC BLOOD PRESSURE: 50 MMHG | SYSTOLIC BLOOD PRESSURE: 112 MMHG

## 2019-07-26 LAB
ANION GAP SERPL CALCULATED.3IONS-SCNC: 12 MMOL/L
BASOPHILS NFR BLD AUTO: 1 % (ref 0–3)
BUN SERPL-MCNC: 16 MG/DL (ref 8–23)
BUN/CREAT SERPL: 17
CHLORIDE SERPL-SCNC: 108 MMOL/L (ref 95–108)
CO2 SERPL-SCNC: 24 MMOL/L (ref 22–30)
CREAT SERPL-MCNC: 0.9 MG/DL (ref 0.7–1.3)
EOSINOPHIL NFR BLD AUTO: 1 % (ref 0–8)
ERYTHROCYTE [DISTWIDTH] IN BLOOD BY AUTOMATED COUNT: 12.2 % (ref 11.5–15.5)
HCT VFR BLD AUTO: 34.3 % (ref 39–50)
HGB BLD-MCNC: 10.9 G/DL (ref 14–18)
IMM GRANULOCYTES NFR BLD: 0.4 % (ref 0–5)
LYMPHOCYTES NFR BLD: 13 % (ref 15–41)
MCH RBC QN AUTO: 30.8 PG  CALC (ref 26–32)
MCHC RBC AUTO-ENTMCNC: 31.8 G/L CALC (ref 32–36)
MCV RBC AUTO: 96.9 FL  CALC (ref 80–100)
MONOCYTES NFR BLD AUTO: 8 % (ref 2–13)
NEUTROPHILS # BLD AUTO: 6.09 THOU/UL (ref 1.82–7.42)
NEUTROPHILS NFR BLD AUTO: 77 % (ref 42–76)
PLATELET # BLD AUTO: 104 THOU/UL (ref 130–400)
POTASSIUM SERPL-SCNC: 4.7 MMOL/L (ref 3.5–5.1)
RBC # BLD AUTO: 3.54 MILL/UL (ref 4.7–6.1)
SODIUM SERPL-SCNC: 139 MMOL/L (ref 137–146)

## 2019-07-26 PROCEDURE — 05HD33Z INSERTION OF INFUSION DEVICE INTO RIGHT CEPHALIC VEIN, PERCUTANEOUS APPROACH: ICD-10-PCS | Performed by: RADIOLOGY

## 2019-07-26 PROCEDURE — B51MZZA FLUOROSCOPY OF RIGHT UPPER EXTREMITY VEINS, GUIDANCE: ICD-10-PCS | Performed by: RADIOLOGY

## 2019-07-27 VITALS — SYSTOLIC BLOOD PRESSURE: 146 MMHG | DIASTOLIC BLOOD PRESSURE: 84 MMHG

## 2019-07-27 VITALS — DIASTOLIC BLOOD PRESSURE: 66 MMHG | SYSTOLIC BLOOD PRESSURE: 120 MMHG

## 2019-07-27 VITALS — DIASTOLIC BLOOD PRESSURE: 56 MMHG | SYSTOLIC BLOOD PRESSURE: 130 MMHG

## 2019-07-27 VITALS — SYSTOLIC BLOOD PRESSURE: 114 MMHG | DIASTOLIC BLOOD PRESSURE: 66 MMHG

## 2019-07-28 VITALS — DIASTOLIC BLOOD PRESSURE: 63 MMHG | SYSTOLIC BLOOD PRESSURE: 122 MMHG

## 2019-07-28 VITALS — SYSTOLIC BLOOD PRESSURE: 141 MMHG | DIASTOLIC BLOOD PRESSURE: 61 MMHG

## 2019-07-28 VITALS — SYSTOLIC BLOOD PRESSURE: 147 MMHG | DIASTOLIC BLOOD PRESSURE: 62 MMHG

## 2019-07-28 VITALS — SYSTOLIC BLOOD PRESSURE: 152 MMHG | DIASTOLIC BLOOD PRESSURE: 65 MMHG

## 2019-07-28 LAB
ANION GAP SERPL CALCULATED.3IONS-SCNC: 11 MMOL/L
BASOPHILS NFR BLD AUTO: 1 % (ref 0–3)
BUN SERPL-MCNC: 9 MG/DL (ref 8–23)
BUN/CREAT SERPL: 9
CHLORIDE SERPL-SCNC: 106 MMOL/L (ref 95–108)
CO2 SERPL-SCNC: 26 MMOL/L (ref 22–30)
CREAT SERPL-MCNC: 1 MG/DL (ref 0.7–1.3)
EOSINOPHIL NFR BLD AUTO: 4 % (ref 0–8)
ERYTHROCYTE [DISTWIDTH] IN BLOOD BY AUTOMATED COUNT: 12.2 % (ref 11.5–15.5)
HCT VFR BLD AUTO: 28.7 % (ref 39–50)
HGB BLD-MCNC: 9.2 G/DL (ref 14–18)
IMM GRANULOCYTES NFR BLD: 0.7 % (ref 0–5)
LYMPHOCYTES NFR BLD: 23 % (ref 15–41)
MAGNESIUM SERPL-MCNC: 1.6 MG/DL (ref 1.6–2.3)
MCH RBC QN AUTO: 30.8 PG  CALC (ref 26–32)
MCHC RBC AUTO-ENTMCNC: 32.1 G/L CALC (ref 32–36)
MCV RBC AUTO: 96 FL  CALC (ref 80–100)
MONOCYTES NFR BLD AUTO: 9 % (ref 2–13)
NEUTROPHILS # BLD AUTO: 4.76 THOU/UL (ref 1.82–7.42)
NEUTROPHILS NFR BLD AUTO: 62 % (ref 42–76)
PLATELET # BLD AUTO: 119 THOU/UL (ref 130–400)
POTASSIUM SERPL-SCNC: 4.7 MMOL/L (ref 3.5–5.1)
RBC # BLD AUTO: 2.99 MILL/UL (ref 4.7–6.1)
SODIUM SERPL-SCNC: 139 MMOL/L (ref 137–146)

## 2019-07-29 VITALS — DIASTOLIC BLOOD PRESSURE: 68 MMHG | SYSTOLIC BLOOD PRESSURE: 151 MMHG

## 2019-07-29 VITALS — DIASTOLIC BLOOD PRESSURE: 76 MMHG | SYSTOLIC BLOOD PRESSURE: 130 MMHG

## 2019-07-29 VITALS — SYSTOLIC BLOOD PRESSURE: 143 MMHG | DIASTOLIC BLOOD PRESSURE: 90 MMHG

## 2019-07-29 VITALS — SYSTOLIC BLOOD PRESSURE: 146 MMHG | DIASTOLIC BLOOD PRESSURE: 72 MMHG

## 2019-07-29 LAB
ANION GAP SERPL CALCULATED.3IONS-SCNC: 13 MMOL/L
BASOPHILS NFR BLD AUTO: 0 % (ref 0–3)
BUN SERPL-MCNC: 10 MG/DL (ref 8–23)
BUN/CREAT SERPL: 10
CHLORIDE SERPL-SCNC: 104 MMOL/L (ref 95–108)
CO2 SERPL-SCNC: 26 MMOL/L (ref 22–30)
CREAT SERPL-MCNC: 1 MG/DL (ref 0.7–1.3)
EOSINOPHIL NFR BLD AUTO: 4 % (ref 0–8)
ERYTHROCYTE [DISTWIDTH] IN BLOOD BY AUTOMATED COUNT: 12.1 % (ref 11.5–15.5)
HCT VFR BLD AUTO: 29.8 % (ref 39–50)
HGB BLD-MCNC: 9.7 G/DL (ref 14–18)
IMM GRANULOCYTES NFR BLD: 0.6 % (ref 0–5)
LYMPHOCYTES NFR BLD: 19 % (ref 15–41)
MAGNESIUM SERPL-MCNC: 1.7 MG/DL (ref 1.6–2.3)
MCH RBC QN AUTO: 30.9 PG  CALC (ref 26–32)
MCHC RBC AUTO-ENTMCNC: 32.6 G/L CALC (ref 32–36)
MCV RBC AUTO: 94.9 FL  CALC (ref 80–100)
MONOCYTES NFR BLD AUTO: 7 % (ref 2–13)
NEUTROPHILS # BLD AUTO: 6.12 THOU/UL (ref 1.82–7.42)
NEUTROPHILS NFR BLD AUTO: 68 % (ref 42–76)
PLATELET # BLD AUTO: 147 THOU/UL (ref 130–400)
POTASSIUM SERPL-SCNC: 4.3 MMOL/L (ref 3.5–5.1)
RBC # BLD AUTO: 3.14 MILL/UL (ref 4.7–6.1)
SODIUM SERPL-SCNC: 138 MMOL/L (ref 137–146)

## 2019-07-30 VITALS — DIASTOLIC BLOOD PRESSURE: 58 MMHG | SYSTOLIC BLOOD PRESSURE: 148 MMHG

## 2019-07-30 VITALS — SYSTOLIC BLOOD PRESSURE: 138 MMHG | DIASTOLIC BLOOD PRESSURE: 66 MMHG

## 2019-07-30 VITALS — SYSTOLIC BLOOD PRESSURE: 142 MMHG | DIASTOLIC BLOOD PRESSURE: 51 MMHG

## 2019-07-30 VITALS — SYSTOLIC BLOOD PRESSURE: 154 MMHG | DIASTOLIC BLOOD PRESSURE: 66 MMHG

## 2019-07-30 VITALS — SYSTOLIC BLOOD PRESSURE: 119 MMHG | DIASTOLIC BLOOD PRESSURE: 60 MMHG

## 2019-07-30 VITALS — DIASTOLIC BLOOD PRESSURE: 44 MMHG | SYSTOLIC BLOOD PRESSURE: 120 MMHG

## 2019-07-30 VITALS — SYSTOLIC BLOOD PRESSURE: 152 MMHG | DIASTOLIC BLOOD PRESSURE: 66 MMHG

## 2019-07-30 VITALS — SYSTOLIC BLOOD PRESSURE: 143 MMHG | DIASTOLIC BLOOD PRESSURE: 73 MMHG

## 2019-07-30 VITALS — DIASTOLIC BLOOD PRESSURE: 78 MMHG | SYSTOLIC BLOOD PRESSURE: 143 MMHG

## 2019-07-30 VITALS — SYSTOLIC BLOOD PRESSURE: 142 MMHG | DIASTOLIC BLOOD PRESSURE: 77 MMHG

## 2019-07-30 VITALS — DIASTOLIC BLOOD PRESSURE: 73 MMHG | SYSTOLIC BLOOD PRESSURE: 127 MMHG

## 2019-07-30 PROCEDURE — BV44ZZZ ULTRASONOGRAPHY OF SCROTUM: ICD-10-PCS | Performed by: UROLOGY

## 2019-07-30 PROCEDURE — 0VQ50ZZ REPAIR SCROTUM, OPEN APPROACH: ICD-10-PCS | Performed by: UROLOGY

## 2019-07-30 PROCEDURE — 02HV33Z INSERTION OF INFUSION DEVICE INTO SUPERIOR VENA CAVA, PERCUTANEOUS APPROACH: ICD-10-PCS | Performed by: NURSE ANESTHETIST, CERTIFIED REGISTERED

## 2019-07-31 VITALS — DIASTOLIC BLOOD PRESSURE: 81 MMHG | SYSTOLIC BLOOD PRESSURE: 165 MMHG

## 2019-07-31 VITALS — DIASTOLIC BLOOD PRESSURE: 79 MMHG | SYSTOLIC BLOOD PRESSURE: 146 MMHG

## 2019-07-31 VITALS — DIASTOLIC BLOOD PRESSURE: 50 MMHG | SYSTOLIC BLOOD PRESSURE: 134 MMHG

## 2019-07-31 VITALS — SYSTOLIC BLOOD PRESSURE: 140 MMHG | DIASTOLIC BLOOD PRESSURE: 75 MMHG

## 2019-07-31 VITALS — SYSTOLIC BLOOD PRESSURE: 133 MMHG | DIASTOLIC BLOOD PRESSURE: 46 MMHG

## 2019-07-31 VITALS — DIASTOLIC BLOOD PRESSURE: 62 MMHG | SYSTOLIC BLOOD PRESSURE: 126 MMHG

## 2019-07-31 VITALS — DIASTOLIC BLOOD PRESSURE: 68 MMHG | SYSTOLIC BLOOD PRESSURE: 147 MMHG

## 2019-07-31 VITALS — SYSTOLIC BLOOD PRESSURE: 117 MMHG | DIASTOLIC BLOOD PRESSURE: 43 MMHG

## 2019-07-31 VITALS — SYSTOLIC BLOOD PRESSURE: 130 MMHG | DIASTOLIC BLOOD PRESSURE: 69 MMHG

## 2019-07-31 VITALS — DIASTOLIC BLOOD PRESSURE: 75 MMHG | SYSTOLIC BLOOD PRESSURE: 155 MMHG

## 2019-07-31 VITALS — DIASTOLIC BLOOD PRESSURE: 69 MMHG | SYSTOLIC BLOOD PRESSURE: 146 MMHG

## 2019-07-31 LAB
ANION GAP SERPL CALCULATED.3IONS-SCNC: 10 MMOL/L
BASOPHILS NFR BLD AUTO: 0 % (ref 0–3)
BUN SERPL-MCNC: 14 MG/DL (ref 8–23)
BUN/CREAT SERPL: 15
CHLORIDE SERPL-SCNC: 106 MMOL/L (ref 95–108)
CO2 SERPL-SCNC: 26 MMOL/L (ref 22–30)
CREAT SERPL-MCNC: 0.9 MG/DL (ref 0.7–1.3)
EOSINOPHIL NFR BLD AUTO: 4 % (ref 0–8)
ERYTHROCYTE [DISTWIDTH] IN BLOOD BY AUTOMATED COUNT: 11.9 % (ref 11.5–15.5)
HCT VFR BLD AUTO: 26.9 % (ref 39–50)
HGB BLD-MCNC: 8.7 G/DL (ref 14–18)
IMM GRANULOCYTES NFR BLD: 0.5 % (ref 0–5)
LYMPHOCYTES NFR BLD: 20 % (ref 15–41)
MCH RBC QN AUTO: 31 PG  CALC (ref 26–32)
MCHC RBC AUTO-ENTMCNC: 32.3 G/L CALC (ref 32–36)
MCV RBC AUTO: 95.7 FL  CALC (ref 80–100)
MONOCYTES NFR BLD AUTO: 8 % (ref 2–13)
NEUTROPHILS # BLD AUTO: 5.17 THOU/UL (ref 1.82–7.42)
NEUTROPHILS NFR BLD AUTO: 67 % (ref 42–76)
PLATELET # BLD AUTO: 149 THOU/UL (ref 130–400)
POTASSIUM SERPL-SCNC: 4.3 MMOL/L (ref 3.5–5.1)
RBC # BLD AUTO: 2.81 MILL/UL (ref 4.7–6.1)
SODIUM SERPL-SCNC: 138 MMOL/L (ref 137–146)

## 2019-07-31 PROCEDURE — 30233N1 TRANSFUSION OF NONAUTOLOGOUS RED BLOOD CELLS INTO PERIPHERAL VEIN, PERCUTANEOUS APPROACH: ICD-10-PCS | Performed by: INTERNAL MEDICINE

## 2019-08-01 VITALS — SYSTOLIC BLOOD PRESSURE: 175 MMHG | DIASTOLIC BLOOD PRESSURE: 81 MMHG

## 2019-08-01 VITALS — SYSTOLIC BLOOD PRESSURE: 135 MMHG | DIASTOLIC BLOOD PRESSURE: 60 MMHG

## 2019-08-01 LAB
ALBUMIN SERPL-MCNC: 2.6 G/DL (ref 3.2–5)
ALP SERPL-CCNC: 80 U/L (ref 38–126)
ANION GAP SERPL CALCULATED.3IONS-SCNC: 8 MMOL/L
AST SERPL-CCNC: 15 U/L (ref 19–48)
BASOPHILS NFR BLD AUTO: 1 % (ref 0–3)
BUN SERPL-MCNC: 16 MG/DL (ref 8–23)
BUN/CREAT SERPL: 17
CHLORIDE SERPL-SCNC: 106 MMOL/L (ref 95–108)
CO2 SERPL-SCNC: 31 MMOL/L (ref 22–30)
CREAT SERPL-MCNC: 1 MG/DL (ref 0.7–1.3)
EOSINOPHIL NFR BLD AUTO: 5 % (ref 0–8)
ERYTHROCYTE [DISTWIDTH] IN BLOOD BY AUTOMATED COUNT: 12.9 % (ref 11.5–15.5)
HCT VFR BLD AUTO: 30.9 % (ref 39–50)
HGB BLD-MCNC: 9.9 G/DL (ref 14–18)
IMM GRANULOCYTES NFR BLD: 0.9 % (ref 0–5)
LYMPHOCYTES NFR BLD: 26 % (ref 15–41)
MCH RBC QN AUTO: 30.1 PG  CALC (ref 26–32)
MCHC RBC AUTO-ENTMCNC: 32 G/L CALC (ref 32–36)
MCV RBC AUTO: 93.9 FL  CALC (ref 80–100)
MONOCYTES NFR BLD AUTO: 10 % (ref 2–13)
NEUTROPHILS # BLD AUTO: 3.23 THOU/UL (ref 1.82–7.42)
NEUTROPHILS NFR BLD AUTO: 58 % (ref 42–76)
PLATELET # BLD AUTO: 136 THOU/UL (ref 130–400)
POTASSIUM SERPL-SCNC: 4.2 MMOL/L (ref 3.5–5.1)
PROT SERPL-MCNC: 5.7 G/DL (ref 6.3–8.2)
RBC # BLD AUTO: 3.29 MILL/UL (ref 4.7–6.1)
SODIUM SERPL-SCNC: 141 MMOL/L (ref 137–146)

## 2019-12-06 ENCOUNTER — HOSPITAL ENCOUNTER (OUTPATIENT)
Dept: HOSPITAL 82 - ED | Age: 69
Setting detail: OBSERVATION
LOS: 1 days | Discharge: HOME | DRG: 313 | End: 2019-12-07
Attending: INTERNAL MEDICINE | Admitting: INTERNAL MEDICINE
Payer: COMMERCIAL

## 2019-12-06 VITALS — WEIGHT: 315 LBS | BODY MASS INDEX: 44.1 KG/M2 | HEIGHT: 71 IN

## 2019-12-06 VITALS — SYSTOLIC BLOOD PRESSURE: 135 MMHG | DIASTOLIC BLOOD PRESSURE: 78 MMHG

## 2019-12-06 VITALS — DIASTOLIC BLOOD PRESSURE: 98 MMHG | SYSTOLIC BLOOD PRESSURE: 174 MMHG

## 2019-12-06 DIAGNOSIS — I25.10: ICD-10-CM

## 2019-12-06 DIAGNOSIS — K21.9: ICD-10-CM

## 2019-12-06 DIAGNOSIS — I10: ICD-10-CM

## 2019-12-06 DIAGNOSIS — Z98.84: ICD-10-CM

## 2019-12-06 DIAGNOSIS — E66.9: ICD-10-CM

## 2019-12-06 DIAGNOSIS — M19.90: ICD-10-CM

## 2019-12-06 DIAGNOSIS — R42: ICD-10-CM

## 2019-12-06 DIAGNOSIS — E78.5: ICD-10-CM

## 2019-12-06 DIAGNOSIS — D69.3: ICD-10-CM

## 2019-12-06 DIAGNOSIS — Z95.0: ICD-10-CM

## 2019-12-06 DIAGNOSIS — F41.9: ICD-10-CM

## 2019-12-06 DIAGNOSIS — R53.1: ICD-10-CM

## 2019-12-06 DIAGNOSIS — I48.91: ICD-10-CM

## 2019-12-06 DIAGNOSIS — R01.1: ICD-10-CM

## 2019-12-06 DIAGNOSIS — R09.02: ICD-10-CM

## 2019-12-06 DIAGNOSIS — R07.9: Primary | ICD-10-CM

## 2019-12-06 DIAGNOSIS — F32.9: ICD-10-CM

## 2019-12-06 LAB
ALBUMIN SERPL-MCNC: 4.3 G/DL (ref 3.2–5)
ALP SERPL-CCNC: 95 U/L (ref 38–126)
ANION GAP SERPL CALCULATED.3IONS-SCNC: 19 MMOL/L
AST SERPL-CCNC: 61 U/L (ref 19–48)
BASOPHILS NFR BLD AUTO: 1 % (ref 0–3)
BUN SERPL-MCNC: 26 MG/DL (ref 8–23)
BUN/CREAT SERPL: 17
CHLORIDE SERPL-SCNC: 103 MMOL/L (ref 95–108)
CO2 SERPL-SCNC: 21 MMOL/L (ref 22–30)
CREAT SERPL-MCNC: 1.6 MG/DL (ref 0.7–1.3)
EOSINOPHIL NFR BLD AUTO: 1 % (ref 0–8)
ERYTHROCYTE [DISTWIDTH] IN BLOOD BY AUTOMATED COUNT: 13.7 % (ref 11.5–15.5)
HCT VFR BLD AUTO: 47 % (ref 39–50)
HGB BLD-MCNC: 15.5 G/DL (ref 14–18)
IMM GRANULOCYTES NFR BLD: 0.5 % (ref 0–5)
LYMPHOCYTES NFR BLD: 17 % (ref 15–41)
MCH RBC QN AUTO: 30.2 PG  CALC (ref 26–32)
MCHC RBC AUTO-ENTMCNC: 33 G/L CALC (ref 32–36)
MCV RBC AUTO: 91.6 FL  CALC (ref 80–100)
MONOCYTES NFR BLD AUTO: 8 % (ref 2–13)
NEUTROPHILS # BLD AUTO: 8.01 THOU/UL (ref 1.82–7.42)
NEUTROPHILS NFR BLD AUTO: 72 % (ref 42–76)
PLATELET # BLD AUTO: 130 THOU/UL (ref 130–400)
POTASSIUM SERPL-SCNC: 4.6 MMOL/L (ref 3.5–5.1)
PROT SERPL-MCNC: 7.7 G/DL (ref 6.3–8.2)
RBC # BLD AUTO: 5.13 MILL/UL (ref 4.7–6.1)
SODIUM SERPL-SCNC: 138 MMOL/L (ref 137–146)

## 2019-12-06 PROCEDURE — G0378 HOSPITAL OBSERVATION PER HR: HCPCS

## 2019-12-07 VITALS — SYSTOLIC BLOOD PRESSURE: 135 MMHG | DIASTOLIC BLOOD PRESSURE: 82 MMHG

## 2019-12-07 VITALS — DIASTOLIC BLOOD PRESSURE: 71 MMHG | SYSTOLIC BLOOD PRESSURE: 121 MMHG

## 2019-12-07 VITALS — SYSTOLIC BLOOD PRESSURE: 134 MMHG | DIASTOLIC BLOOD PRESSURE: 79 MMHG

## 2019-12-07 LAB
CHOLEST SERPL-MCNC: 130 MG/DL (ref 0–199)
CHOLEST/HDLC SERPL: 3.6 {RATIO}
HDLC SERPL-MCNC: 36 MG/DL (ref 40–?)
LDLC SERPL CALC-MCNC: 77 MG/DL
MAGNESIUM SERPL-MCNC: 1.7 MG/DL (ref 1.6–2.3)
TRIGL SERPL-MCNC: 85 MG/DL (ref 30–149)
VLDLC SERPL CALC-MCNC: 17 MG/DL

## 2020-08-26 ENCOUNTER — HOSPITAL ENCOUNTER (EMERGENCY)
Dept: HOSPITAL 82 - ED | Age: 70
Discharge: HOME | DRG: 392 | End: 2020-08-26
Payer: COMMERCIAL

## 2020-08-26 VITALS — DIASTOLIC BLOOD PRESSURE: 74 MMHG | SYSTOLIC BLOOD PRESSURE: 118 MMHG

## 2020-08-26 VITALS — HEIGHT: 71 IN | WEIGHT: 315 LBS | BODY MASS INDEX: 44.1 KG/M2

## 2020-08-26 DIAGNOSIS — M54.2: ICD-10-CM
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
## 2020-08-26 DIAGNOSIS — M54.5: ICD-10-CM

## 2020-08-26 DIAGNOSIS — R10.31: Primary | ICD-10-CM

## 2020-08-26 DIAGNOSIS — I10: ICD-10-CM

## 2020-08-26 DIAGNOSIS — M25.511: ICD-10-CM

## 2020-08-26 DIAGNOSIS — I48.91: ICD-10-CM

## 2020-08-26 LAB
ALBUMIN SERPL-MCNC: 3.7 G/DL (ref 3.2–5)
ALP SERPL-CCNC: 71 U/L (ref 38–126)
ANION GAP SERPL CALCULATED.3IONS-SCNC: 13 MMOL/L
AST SERPL-CCNC: 26 U/L (ref 19–48)
BASOPHILS NFR BLD AUTO: 1 % (ref 0–3)
BUN SERPL-MCNC: 25 MG/DL (ref 8–23)
BUN/CREAT SERPL: 20
CHLORIDE SERPL-SCNC: 104 MMOL/L (ref 95–108)
CO2 SERPL-SCNC: 25 MMOL/L (ref 22–30)
CREAT SERPL-MCNC: 1.2 MG/DL (ref 0.7–1.3)
EOSINOPHIL NFR BLD AUTO: 3 % (ref 0–8)
ERYTHROCYTE [DISTWIDTH] IN BLOOD BY AUTOMATED COUNT: 12.8 % (ref 11.5–15.5)
HCT VFR BLD AUTO: 43 % (ref 39–50)
HGB BLD-MCNC: 13.7 G/DL (ref 14–18)
IMM GRANULOCYTES NFR BLD: 0.6 % (ref 0–5)
LIPASE SERPL-CCNC: 35 U/L (ref 23–300)
LYMPHOCYTES NFR BLD: 21 % (ref 15–41)
MCH RBC QN AUTO: 30.3 PG  CALC (ref 26–32)
MCHC RBC AUTO-ENTMCNC: 31.9 G/DL CAL (ref 32–36)
MCV RBC AUTO: 95.1 FL  CALC (ref 80–100)
MONOCYTES NFR BLD AUTO: 9 % (ref 2–13)
NEUTROPHILS # BLD AUTO: 4.6 THOU/UL (ref 1.82–7.42)
NEUTROPHILS NFR BLD AUTO: 67 % (ref 42–76)
PLATELET # BLD AUTO: 80 THOU/UL (ref 130–400)
POTASSIUM SERPL-SCNC: 4.5 MMOL/L (ref 3.5–5.1)
PROT SERPL-MCNC: 6.6 G/DL (ref 6.3–8.2)
RBC # BLD AUTO: 4.52 MILL/UL (ref 4.7–6.1)
SODIUM SERPL-SCNC: 137 MMOL/L (ref 137–146)

## 2021-08-23 ENCOUNTER — HOSPITAL ENCOUNTER (EMERGENCY)
Dept: HOSPITAL 82 - ED | Age: 71
Discharge: HOME | DRG: 605 | End: 2021-08-23
Payer: COMMERCIAL

## 2021-08-23 VITALS — SYSTOLIC BLOOD PRESSURE: 171 MMHG | DIASTOLIC BLOOD PRESSURE: 74 MMHG

## 2021-08-23 VITALS — BODY MASS INDEX: 44.1 KG/M2 | WEIGHT: 315 LBS | HEIGHT: 71 IN

## 2021-08-23 DIAGNOSIS — I48.91: ICD-10-CM

## 2021-08-23 DIAGNOSIS — S40.012A: ICD-10-CM

## 2021-08-23 DIAGNOSIS — I10: ICD-10-CM
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
## 2021-08-23 DIAGNOSIS — Z87.820: ICD-10-CM

## 2021-08-23 DIAGNOSIS — S16.1XXA: ICD-10-CM

## 2021-08-23 DIAGNOSIS — S20.212A: ICD-10-CM

## 2021-08-23 DIAGNOSIS — S00.93XA: Primary | ICD-10-CM

## 2021-08-23 DIAGNOSIS — E11.9: ICD-10-CM

## 2021-08-23 DIAGNOSIS — Z95.0: ICD-10-CM
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
## 2021-09-07 ENCOUNTER — HOSPITAL ENCOUNTER (EMERGENCY)
Dept: HOSPITAL 82 - ED | Age: 71
Discharge: HOME | DRG: 392 | End: 2021-09-07
Payer: COMMERCIAL

## 2021-09-07 VITALS — WEIGHT: 315 LBS | HEIGHT: 71 IN | BODY MASS INDEX: 44.1 KG/M2

## 2021-09-07 VITALS — DIASTOLIC BLOOD PRESSURE: 96 MMHG | SYSTOLIC BLOOD PRESSURE: 158 MMHG

## 2021-09-07 DIAGNOSIS — R10.9: Primary | ICD-10-CM

## 2021-09-07 DIAGNOSIS — I48.91: ICD-10-CM

## 2021-09-07 DIAGNOSIS — N20.0: ICD-10-CM

## 2021-09-07 DIAGNOSIS — I10: ICD-10-CM
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
## 2021-09-07 DIAGNOSIS — E11.9: ICD-10-CM

## 2021-09-07 DIAGNOSIS — R82.71: ICD-10-CM

## 2021-09-07 LAB
ALBUMIN SERPL-MCNC: 3.7 G/DL (ref 3.2–5)
ALP SERPL-CCNC: 111 U/L (ref 38–126)
ANION GAP SERPL CALCULATED.3IONS-SCNC: 14 MMOL/L
AST SERPL-CCNC: 39 U/L (ref 19–48)
BACTERIA #/AREA URNS HPF: (no result) HPF
BASOPHILS NFR BLD AUTO: 1 % (ref 0–3)
BILIRUB UR QL STRIP.AUTO: NEGATIVE
BUN SERPL-MCNC: 21 MG/DL (ref 8–23)
BUN/CREAT SERPL: 22
CHLORIDE SERPL-SCNC: 100 MMOL/L (ref 95–108)
CO2 SERPL-SCNC: 27 MMOL/L (ref 22–30)
COLOR UR AUTO: YELLOW
CREAT SERPL-MCNC: 1 MG/DL (ref 0.7–1.3)
EOSINOPHIL NFR BLD AUTO: 2 % (ref 0–8)
ERYTHROCYTE [DISTWIDTH] IN BLOOD BY AUTOMATED COUNT: 12.2 % (ref 11.5–15.5)
GLUCOSE UR STRIP.AUTO-MCNC: >=1000 MG/DL
HCT VFR BLD AUTO: 47.9 % (ref 39–50)
HGB BLD-MCNC: 15.7 G/DL (ref 14–18)
HGB UR QL STRIP.AUTO: (no result)
IMM GRANULOCYTES NFR BLD: 0.4 % (ref 0–5)
KETONES UR STRIP.AUTO-MCNC: (no result) MG/DL
LEUKOCYTE ESTERASE UR QL STRIP.AUTO: (no result)
LYMPHOCYTES NFR BLD: 18 % (ref 15–41)
MCH RBC QN AUTO: 31.3 PG  CALC (ref 26–32)
MCHC RBC AUTO-ENTMCNC: 32.8 G/DL CAL (ref 32–36)
MCV RBC AUTO: 95.4 FL  CALC (ref 80–100)
MONOCYTES NFR BLD AUTO: 9 % (ref 2–13)
NEUTROPHILS # BLD AUTO: 5.48 THOU/UL (ref 1.82–7.42)
NEUTROPHILS NFR BLD AUTO: 69 % (ref 42–76)
NITRITE UR QL STRIP.AUTO: POSITIVE
PH UR STRIP.AUTO: 6 [PH] (ref 4.5–8)
PLATELET # BLD AUTO: 77 THOU/UL (ref 130–400)
POTASSIUM SERPL-SCNC: 4.5 MMOL/L (ref 3.5–5.1)
PROT SERPL-MCNC: 6.9 G/DL (ref 6.3–8.2)
PROT UR QL STRIP.AUTO: (no result) MG/DL
RBC # BLD AUTO: 5.02 MILL/UL (ref 4.7–6.1)
RBC #/AREA URNS HPF: (no result) RBC/HPF (ref 0–5)
SODIUM SERPL-SCNC: 137 MMOL/L (ref 137–146)
SP GR UR STRIP.AUTO: 1.01
UROBILINOGEN UR QL STRIP.AUTO: 0.2 E.U./DL
WBC #/AREA URNS HPF: (no result) WBC/HPF (ref 0–5)

## 2021-11-08 ENCOUNTER — HOSPITAL ENCOUNTER (OUTPATIENT)
Dept: HOSPITAL 82 - ED | Age: 71
Setting detail: OBSERVATION
LOS: 1 days | Discharge: LEFT BEFORE DISCHARGE | DRG: 313 | End: 2021-11-09
Attending: STUDENT IN AN ORGANIZED HEALTH CARE EDUCATION/TRAINING PROGRAM | Admitting: STUDENT IN AN ORGANIZED HEALTH CARE EDUCATION/TRAINING PROGRAM
Payer: COMMERCIAL

## 2021-11-08 VITALS — DIASTOLIC BLOOD PRESSURE: 86 MMHG | SYSTOLIC BLOOD PRESSURE: 186 MMHG

## 2021-11-08 VITALS — HEIGHT: 71 IN | BODY MASS INDEX: 44.1 KG/M2 | WEIGHT: 315 LBS

## 2021-11-08 VITALS — DIASTOLIC BLOOD PRESSURE: 65 MMHG | SYSTOLIC BLOOD PRESSURE: 145 MMHG

## 2021-11-08 DIAGNOSIS — E66.01: ICD-10-CM

## 2021-11-08 DIAGNOSIS — R73.03: ICD-10-CM

## 2021-11-08 DIAGNOSIS — Z95.0: ICD-10-CM

## 2021-11-08 DIAGNOSIS — I48.91: ICD-10-CM

## 2021-11-08 DIAGNOSIS — Z20.822: ICD-10-CM

## 2021-11-08 DIAGNOSIS — R07.9: Primary | ICD-10-CM

## 2021-11-08 LAB
ALBUMIN SERPL-MCNC: 3.8 G/DL (ref 3.2–5)
ALP SERPL-CCNC: 91 U/L (ref 38–126)
AMYLASE SERPL-CCNC: 49 U/L (ref 30–110)
ANION GAP SERPL CALCULATED.3IONS-SCNC: 12 MMOL/L
AST SERPL-CCNC: 28 U/L (ref 19–48)
BASOPHILS NFR BLD AUTO: 1 % (ref 0–3)
BUN SERPL-MCNC: 28 MG/DL (ref 8–23)
BUN/CREAT SERPL: 25
CHLORIDE SERPL-SCNC: 103 MMOL/L (ref 95–108)
CO2 SERPL-SCNC: 30 MMOL/L (ref 22–30)
CREAT SERPL-MCNC: 1.1 MG/DL (ref 0.7–1.3)
EOSINOPHIL NFR BLD AUTO: 1 % (ref 0–8)
ERYTHROCYTE [DISTWIDTH] IN BLOOD BY AUTOMATED COUNT: 12.3 % (ref 11.5–15.5)
HCT VFR BLD AUTO: 46.4 % (ref 39–50)
HGB BLD-MCNC: 15.4 G/DL (ref 14–18)
IMM GRANULOCYTES NFR BLD: 0.2 % (ref 0–5)
LIPASE SERPL-CCNC: 23 U/L (ref 23–300)
LYMPHOCYTES NFR BLD: 13 % (ref 15–41)
MCH RBC QN AUTO: 31.1 PG  CALC (ref 26–32)
MCHC RBC AUTO-ENTMCNC: 33.2 G/DL CAL (ref 32–36)
MCV RBC AUTO: 93.7 FL  CALC (ref 80–100)
MONOCYTES NFR BLD AUTO: 6 % (ref 2–13)
MYOGLOBIN SERPL-MCNC: 71 NG/ML (ref 0–121)
NEUTROPHILS # BLD AUTO: 6.97 THOU/UL (ref 1.82–7.42)
NEUTROPHILS NFR BLD AUTO: 80 % (ref 42–76)
PLATELET # BLD AUTO: 106 THOU/UL (ref 130–400)
POTASSIUM SERPL-SCNC: 5.1 MMOL/L (ref 3.5–5.1)
PROT SERPL-MCNC: 6.9 G/DL (ref 6.3–8.2)
RBC # BLD AUTO: 4.95 MILL/UL (ref 4.7–6.1)
SODIUM SERPL-SCNC: 140 MMOL/L (ref 137–146)

## 2021-11-08 PROCEDURE — G0378 HOSPITAL OBSERVATION PER HR: HCPCS

## 2021-11-08 NOTE — NUR
INFORMED BY YARELI FROM PHARMACY TO REMOVE OLD FENTANYL PATCH TO BE REMOVED AND
NEW ONES APPLIED. PT STATES FENTANYL PATCH WAS PLACED TODAY ON LEFT ABD, NO
PATCH NOTED. SEARCHED REST OF BODY, NO FENTANYL PATCH FOUND. MYESHA PEREIRA
ATTEMPTED TO FIND FENTANYL PATCH. WRITER INFORMED YARELI FROM PHARMACY THERE
WAS NO FENTANYL PATCH FOUND ON PT. NEW 50 FENTAYL X2 PATCH PLACED TO RIGHT
ARM AND SECURED WITH TAPE.

## 2021-11-08 NOTE — NUR
PT ARRIVED TO [facility] ROOM 272 VIA PORTABLE ACCOMPAINED BY ER STAFF. PT IS
A/OX3. ASSEESSMENT AND VITALS COMPLETED. RESPIRATIONS ARE EVEN AND UNLABORED
WITH NO DISTRESS NOTED ON ROOM AIR. LUNG SOUNDS ARE CLEAR. HEART RHYTHM NORMAL
WITH TELE IN PLACE, PACE 60 PER ER MONITORING. (8610). BOWEL SOUNDS ARE
ACTIVE. #20G RAC FLUSHED, SITE APPEARS HEALTHY AND PATENT. SKIN INTACT. PT
COMPLAINS OF CHEST DISCOMFORT THAT HE REPORTS HAS BEEN HAVING FOR THE PAST 2
DAYS AND 10/10 BACK PAIN. PT MEDICATED PER EMAR. ALLERGIES NOTED, ALLERGY
BAND AND FALL BAND APPLIED. PT ORIENTED TO ROOM AND CALL SYSTEM. ALL SAFETY
PRECAUTIONS ARE IN PLACE WITH CALL LIGHT IN REACH. WILL CONTINUE TO MONITOR.

## 2021-11-08 NOTE — NUR
PT ARRIVED TO [facility] ROOM 272 VIA PORTABLE ACCOMPAINED BY ER STAFF. PT IS
A/OX3. ASSEESSMENT AND VITALS COMPLETED. RESPIRATIONS ARE EVEN AND UNLABORED
WITH NO DISTRESS NOTED ON ROOM AIR. LUNG SOUNDS ARE CLEAR. HEART RHYTHM NORMAL
WITH TELE IN PLACE, PACE 60 PER ER MONITORING. (8610). BOWEL SOUNDS ARE
ACTIVE. #20G RAC FLUSHED, SITE APPEARS HEALTHY AND PATENT. SKIN INTACT. PT
COMPLAINS OF CHEST DISCOMFORT THAT HE REPORTS HAS BEEN HAVING FOR THE PAST 2
DAYS AND 10/10 BACK PAIN. PT MEDICATED PER EMAR. ALLERGIES NOTED, ALLERGY
BAND AND FALL BAND APPLIED. PT ORIENTED TO ROOM AND CALL SYSTEM. ALL SAFETY
PRECAUTIONS ARE IN PLACE WITH CALL LIGHT IN REACH. WILL CONTINUE TO MONITOR.

## 2021-11-08 NOTE — NUR
PT IS
ALERT AND ORIENTED X3.
ASSEESSMENT.
LUNG SOUNDS ARE CLEAR BILATERALLY AND THROUGHOUT LOBES. HEART RHYTHM NORMAL
LAST TELEMETRY READING PACED 60.BOWEL SOUNDS ARE ACTIVE, LAST REPORTED BOWEL
MOVEMENT 11/8/21 #20G RAC FLUSHED, SITE HEALTHY AND PATENT. NO COMPLAINT OF
CHEST PAIN AT THIS TIME, PT REPORTS FEELING DISCOMFORT. PT MEDICATED PER
EMAR. PATIENT ORIENTED TO ROOM AND CALL SYSTEM, PLAN OF CARE REVIEWED WITH
PATIENT. CALL LIGTH AND BEDSIDE TABLE WITH IN REACH.

## 2021-11-09 VITALS — SYSTOLIC BLOOD PRESSURE: 126 MMHG | DIASTOLIC BLOOD PRESSURE: 69 MMHG

## 2021-11-09 NOTE — NUR
PATIENT REQUESTING TO LEAVE AMA. REFUSED TO SIGN PAPER, STATING IT WAS DUE TO
ENVIROMENTAL FACTORS AND NOT CARE PROVIDED. MARLIN MAYER RN TO WITNESS.

## 2021-11-09 NOTE — NUR
WRITTER INFOEMED PT WOULD LIKE TO LEAVE AMA. PT STATES HE IS VERY COLD AND
DOES NOT WANT ANY MORE BLANKETS. NOTIFIED HOUSE SUPERVISOR REGARDING PT
DECICION. NOTIFIED DR BATISTA. PATIENT REFUSED TO SIGN AMA FORM MARLIN REBOLLEDO RN TO
WITNESS

## 2021-11-09 NOTE — NUR
Patient decides to leave AMA.  Multiple attempts made to ecourage patient to
remain here for continued treatment.  Explained to patient all risks of leaving
against medical advice including death. Pt verbalized understanding of all
risks.  Pt also encouraged to return to [facility] at any
time, especially if symptoms continue or become worse.  Pt verbalized
understanding.

## 2021-12-18 ENCOUNTER — HOSPITAL ENCOUNTER (EMERGENCY)
Dept: HOSPITAL 82 - ED | Age: 71
Discharge: LEFT BEFORE DISCHARGE | DRG: 92 | End: 2021-12-18
Payer: COMMERCIAL

## 2021-12-18 VITALS — HEIGHT: 71 IN | BODY MASS INDEX: 44.1 KG/M2 | WEIGHT: 315 LBS

## 2021-12-18 VITALS — DIASTOLIC BLOOD PRESSURE: 76 MMHG | SYSTOLIC BLOOD PRESSURE: 142 MMHG

## 2021-12-18 DIAGNOSIS — R73.03: ICD-10-CM

## 2021-12-18 DIAGNOSIS — G89.29: ICD-10-CM

## 2021-12-18 DIAGNOSIS — Z95.0: ICD-10-CM

## 2021-12-18 DIAGNOSIS — R47.1: ICD-10-CM

## 2021-12-18 DIAGNOSIS — Z91.19: ICD-10-CM

## 2021-12-18 DIAGNOSIS — R29.6: Primary | ICD-10-CM

## 2021-12-18 DIAGNOSIS — Z79.899: ICD-10-CM

## 2021-12-18 DIAGNOSIS — Z20.822: ICD-10-CM

## 2021-12-18 DIAGNOSIS — R47.01: ICD-10-CM

## 2021-12-18 DIAGNOSIS — I48.91: ICD-10-CM

## 2021-12-18 LAB
ALBUMIN SERPL-MCNC: 3.4 G/DL (ref 3.2–5)
ALP SERPL-CCNC: 76 U/L (ref 38–126)
ANION GAP SERPL CALCULATED.3IONS-SCNC: 9 MMOL/L
APTT PPP: 24.5 SECONDS (ref 20–32.5)
AST SERPL-CCNC: 25 U/L (ref 19–48)
BASOPHILS NFR BLD AUTO: 1 % (ref 0–3)
BILIRUB UR QL STRIP.AUTO: NEGATIVE
BUN SERPL-MCNC: 19 MG/DL (ref 8–23)
BUN/CREAT SERPL: 20
CHLORIDE SERPL-SCNC: 103 MMOL/L (ref 95–108)
CO2 SERPL-SCNC: 28 MMOL/L (ref 22–30)
COLOR UR AUTO: YELLOW
CREAT SERPL-MCNC: 1 MG/DL (ref 0.7–1.3)
EOSINOPHIL NFR BLD AUTO: 3 % (ref 0–8)
ERYTHROCYTE [DISTWIDTH] IN BLOOD BY AUTOMATED COUNT: 12.8 % (ref 11.5–15.5)
ETHANOL SERPL-MCNC: 0 MG/DL (ref 0–30)
GLUCOSE UR STRIP.AUTO-MCNC: NEGATIVE MG/DL
HCT VFR BLD AUTO: 41.8 % (ref 39–50)
HGB BLD-MCNC: 13.4 G/DL (ref 14–18)
HGB UR QL STRIP.AUTO: NEGATIVE
IMM GRANULOCYTES NFR BLD: 0.3 % (ref 0–5)
INR PPP: 1 RATIO (ref 0.7–1.3)
KETONES UR STRIP.AUTO-MCNC: NEGATIVE MG/DL
LEUKOCYTE ESTERASE UR QL STRIP.AUTO: NEGATIVE
LIPASE SERPL-CCNC: 16 U/L (ref 23–300)
LYMPHOCYTES NFR BLD: 19 % (ref 15–41)
MAGNESIUM SERPL-MCNC: 1.6 MG/DL (ref 1.6–2.3)
MCH RBC QN AUTO: 30.9 PG  CALC (ref 26–32)
MCHC RBC AUTO-ENTMCNC: 32.1 G/DL CAL (ref 32–36)
MCV RBC AUTO: 96.5 FL  CALC (ref 80–100)
MONOCYTES NFR BLD AUTO: 7 % (ref 2–13)
NEUTROPHILS # BLD AUTO: 4.61 THOU/UL (ref 1.82–7.42)
NEUTROPHILS NFR BLD AUTO: 71 % (ref 42–76)
NITRITE UR QL STRIP.AUTO: NEGATIVE
PH UR STRIP.AUTO: 6 [PH] (ref 4.5–8)
PLATELET # BLD AUTO: 90 THOU/UL (ref 130–400)
POTASSIUM SERPL-SCNC: 4.5 MMOL/L (ref 3.5–5.1)
PROT SERPL-MCNC: 6.5 G/DL (ref 6.3–8.2)
PROT UR QL STRIP.AUTO: NEGATIVE MG/DL
PROTHROMBIN TIME: 10.4 SECONDS (ref 9–12.5)
RBC # BLD AUTO: 4.33 MILL/UL (ref 4.7–6.1)
SODIUM SERPL-SCNC: 136 MMOL/L (ref 137–146)
SP GR UR STRIP.AUTO: 1.02
UROBILINOGEN UR QL STRIP.AUTO: 1 E.U./DL

## 2022-09-30 ENCOUNTER — HOSPITAL ENCOUNTER (EMERGENCY)
Dept: HOSPITAL 82 - ED | Age: 72
Discharge: LEFT BEFORE DISCHARGE | DRG: 918 | End: 2022-09-30
Payer: COMMERCIAL

## 2022-09-30 VITALS — DIASTOLIC BLOOD PRESSURE: 90 MMHG | SYSTOLIC BLOOD PRESSURE: 132 MMHG

## 2022-09-30 VITALS — DIASTOLIC BLOOD PRESSURE: 57 MMHG | SYSTOLIC BLOOD PRESSURE: 134 MMHG

## 2022-09-30 VITALS — WEIGHT: 275.58 LBS | BODY MASS INDEX: 38.58 KG/M2 | HEIGHT: 71 IN

## 2022-09-30 VITALS — SYSTOLIC BLOOD PRESSURE: 142 MMHG | DIASTOLIC BLOOD PRESSURE: 68 MMHG

## 2022-09-30 VITALS — SYSTOLIC BLOOD PRESSURE: 123 MMHG | DIASTOLIC BLOOD PRESSURE: 67 MMHG

## 2022-09-30 VITALS — SYSTOLIC BLOOD PRESSURE: 132 MMHG | DIASTOLIC BLOOD PRESSURE: 90 MMHG

## 2022-09-30 DIAGNOSIS — T42.4X1A: Primary | ICD-10-CM

## 2022-09-30 DIAGNOSIS — E66.01: ICD-10-CM

## 2022-09-30 DIAGNOSIS — Z95.0: ICD-10-CM

## 2022-09-30 DIAGNOSIS — Z53.29: ICD-10-CM

## 2022-09-30 DIAGNOSIS — G89.29: ICD-10-CM

## 2022-09-30 DIAGNOSIS — I48.91: ICD-10-CM

## 2022-10-01 ENCOUNTER — HOSPITAL ENCOUNTER (INPATIENT)
Dept: HOSPITAL 82 - ED | Age: 72
LOS: 2 days | Discharge: HOME | DRG: 872 | End: 2022-10-03
Attending: INTERNAL MEDICINE | Admitting: INTERNAL MEDICINE
Payer: COMMERCIAL

## 2022-10-01 VITALS — SYSTOLIC BLOOD PRESSURE: 132 MMHG | DIASTOLIC BLOOD PRESSURE: 58 MMHG

## 2022-10-01 VITALS — SYSTOLIC BLOOD PRESSURE: 123 MMHG | DIASTOLIC BLOOD PRESSURE: 57 MMHG

## 2022-10-01 VITALS — DIASTOLIC BLOOD PRESSURE: 61 MMHG | SYSTOLIC BLOOD PRESSURE: 122 MMHG

## 2022-10-01 VITALS — DIASTOLIC BLOOD PRESSURE: 60 MMHG | SYSTOLIC BLOOD PRESSURE: 102 MMHG

## 2022-10-01 VITALS — DIASTOLIC BLOOD PRESSURE: 73 MMHG | SYSTOLIC BLOOD PRESSURE: 123 MMHG

## 2022-10-01 VITALS — SYSTOLIC BLOOD PRESSURE: 157 MMHG | DIASTOLIC BLOOD PRESSURE: 97 MMHG

## 2022-10-01 VITALS — DIASTOLIC BLOOD PRESSURE: 77 MMHG | SYSTOLIC BLOOD PRESSURE: 134 MMHG

## 2022-10-01 VITALS — DIASTOLIC BLOOD PRESSURE: 75 MMHG | SYSTOLIC BLOOD PRESSURE: 107 MMHG

## 2022-10-01 VITALS — DIASTOLIC BLOOD PRESSURE: 70 MMHG | SYSTOLIC BLOOD PRESSURE: 125 MMHG

## 2022-10-01 VITALS — SYSTOLIC BLOOD PRESSURE: 120 MMHG | DIASTOLIC BLOOD PRESSURE: 90 MMHG

## 2022-10-01 VITALS — SYSTOLIC BLOOD PRESSURE: 162 MMHG | DIASTOLIC BLOOD PRESSURE: 97 MMHG

## 2022-10-01 VITALS — SYSTOLIC BLOOD PRESSURE: 126 MMHG | DIASTOLIC BLOOD PRESSURE: 69 MMHG

## 2022-10-01 VITALS — BODY MASS INDEX: 44.1 KG/M2 | WEIGHT: 315 LBS | HEIGHT: 71 IN

## 2022-10-01 VITALS — SYSTOLIC BLOOD PRESSURE: 126 MMHG | DIASTOLIC BLOOD PRESSURE: 66 MMHG

## 2022-10-01 VITALS — SYSTOLIC BLOOD PRESSURE: 108 MMHG | DIASTOLIC BLOOD PRESSURE: 54 MMHG

## 2022-10-01 VITALS — SYSTOLIC BLOOD PRESSURE: 123 MMHG | DIASTOLIC BLOOD PRESSURE: 69 MMHG

## 2022-10-01 VITALS — SYSTOLIC BLOOD PRESSURE: 142 MMHG | DIASTOLIC BLOOD PRESSURE: 77 MMHG

## 2022-10-01 VITALS — SYSTOLIC BLOOD PRESSURE: 101 MMHG | DIASTOLIC BLOOD PRESSURE: 74 MMHG

## 2022-10-01 VITALS — SYSTOLIC BLOOD PRESSURE: 118 MMHG | DIASTOLIC BLOOD PRESSURE: 65 MMHG

## 2022-10-01 VITALS — DIASTOLIC BLOOD PRESSURE: 73 MMHG | SYSTOLIC BLOOD PRESSURE: 132 MMHG

## 2022-10-01 VITALS — DIASTOLIC BLOOD PRESSURE: 64 MMHG | SYSTOLIC BLOOD PRESSURE: 118 MMHG

## 2022-10-01 VITALS — DIASTOLIC BLOOD PRESSURE: 58 MMHG | SYSTOLIC BLOOD PRESSURE: 132 MMHG

## 2022-10-01 VITALS — SYSTOLIC BLOOD PRESSURE: 97 MMHG | DIASTOLIC BLOOD PRESSURE: 62 MMHG

## 2022-10-01 DIAGNOSIS — G89.29: ICD-10-CM

## 2022-10-01 DIAGNOSIS — E66.01: ICD-10-CM

## 2022-10-01 DIAGNOSIS — E78.5: ICD-10-CM

## 2022-10-01 DIAGNOSIS — E11.9: ICD-10-CM

## 2022-10-01 DIAGNOSIS — Z91.81: ICD-10-CM

## 2022-10-01 DIAGNOSIS — D69.3: ICD-10-CM

## 2022-10-01 DIAGNOSIS — M62.82: ICD-10-CM

## 2022-10-01 DIAGNOSIS — F32.A: ICD-10-CM

## 2022-10-01 DIAGNOSIS — A41.51: Primary | ICD-10-CM

## 2022-10-01 DIAGNOSIS — Z20.822: ICD-10-CM

## 2022-10-01 DIAGNOSIS — N39.0: ICD-10-CM

## 2022-10-01 DIAGNOSIS — N40.0: ICD-10-CM

## 2022-10-01 DIAGNOSIS — F06.4: ICD-10-CM

## 2022-10-01 DIAGNOSIS — Z95.0: ICD-10-CM

## 2022-10-01 DIAGNOSIS — I25.10: ICD-10-CM

## 2022-10-01 DIAGNOSIS — I10: ICD-10-CM

## 2022-10-01 DIAGNOSIS — M54.9: ICD-10-CM

## 2022-10-01 DIAGNOSIS — I48.91: ICD-10-CM

## 2022-10-01 LAB
ALBUMIN SERPL-MCNC: 3.9 G/DL (ref 3.2–5)
ALP SERPL-CCNC: 90 U/L (ref 38–126)
ANION GAP SERPL CALCULATED.3IONS-SCNC: 14 MMOL/L
AST SERPL-CCNC: 34 U/L (ref 19–48)
BACTERIA #/AREA URNS HPF: (no result) HPF
BASOPHILS NFR BLD AUTO: 0 % (ref 0–3)
BILIRUB UR QL STRIP.AUTO: NEGATIVE
BUN SERPL-MCNC: 18 MG/DL (ref 8–23)
BUN/CREAT SERPL: 16
CHLORIDE SERPL-SCNC: 99 MMOL/L (ref 95–108)
CO2 SERPL-SCNC: 26 MMOL/L (ref 22–30)
COLOR UR AUTO: YELLOW
CREAT SERPL-MCNC: 1.1 MG/DL (ref 0.7–1.3)
EOSINOPHIL NFR BLD AUTO: 0 % (ref 0–8)
EPI CELLS URNS QL MICRO: (no result) EPI/HPF
ERYTHROCYTE [DISTWIDTH] IN BLOOD BY AUTOMATED COUNT: 12.3 % (ref 11.5–15.5)
ERYTHROCYTE [DISTWIDTH] IN BLOOD BY AUTOMATED COUNT: 12.5 % (ref 11.5–15.5)
GLUCOSE UR STRIP.AUTO-MCNC: NEGATIVE MG/DL
HCT VFR BLD AUTO: 42.7 % (ref 39–50)
HCT VFR BLD AUTO: 43.8 % (ref 39–50)
HGB BLD-MCNC: 14 G/DL (ref 14–18)
HGB BLD-MCNC: 14.4 G/DL (ref 14–18)
HGB UR QL STRIP.AUTO: (no result)
IMM GRANULOCYTES NFR BLD: 1.6 % (ref 0–5)
KETONES UR STRIP.AUTO-MCNC: 15 MG/DL
LEUKOCYTE ESTERASE UR QL STRIP.AUTO: (no result)
LYMPHOCYTES NFR BLD: 4 % (ref 15–41)
MCH RBC QN AUTO: 31.4 PG  CALC (ref 26–32)
MCH RBC QN AUTO: 31.9 PG  CALC (ref 26–32)
MCHC RBC AUTO-ENTMCNC: 32.8 G/DL CAL (ref 32–36)
MCHC RBC AUTO-ENTMCNC: 32.9 G/DL CAL (ref 32–36)
MCV RBC AUTO: 95.7 FL  CALC (ref 80–100)
MCV RBC AUTO: 96.9 FL  CALC (ref 80–100)
MONOCYTES NFR BLD AUTO: 7 % (ref 2–13)
MYOGLOBIN SERPL-MCNC: 664 NG/ML (ref 0–121)
NEUTROPHILS # BLD AUTO: 18.18 THOU/UL (ref 1.82–7.42)
NEUTROPHILS NFR BLD AUTO: 88 % (ref 42–76)
NITRITE UR QL STRIP.AUTO: POSITIVE
PH UR STRIP.AUTO: 6 [PH] (ref 4.5–8)
PLATELET # BLD AUTO: 85 THOU/UL (ref 130–400)
PLATELET # BLD AUTO: 89 THOU/UL (ref 130–400)
POTASSIUM SERPL-SCNC: 4.1 MMOL/L (ref 3.5–5.1)
PROT SERPL-MCNC: 7.1 G/DL (ref 6.3–8.2)
PROT UR QL STRIP.AUTO: 30 MG/DL
RBC # BLD AUTO: 4.46 MILL/UL (ref 4.7–6.1)
RBC # BLD AUTO: 4.52 MILL/UL (ref 4.7–6.1)
RBC #/AREA URNS HPF: (no result) RBC/HPF (ref 0–5)
SODIUM SERPL-SCNC: 135 MMOL/L (ref 137–146)
SP GR UR STRIP.AUTO: 1.01
UROBILINOGEN UR QL STRIP.AUTO: 0.2 E.U./DL

## 2022-10-01 PROCEDURE — 0T9B70Z DRAINAGE OF BLADDER WITH DRAINAGE DEVICE, VIA NATURAL OR ARTIFICIAL OPENING: ICD-10-PCS | Performed by: FAMILY MEDICINE

## 2022-10-01 NOTE — NUR
PT TRANSPORTED TO MED/TELE FLOOR WITH TELE BOX AND O2 ON. ALERT AND ORIENTED TO
SELF. REPORT GIVEN VIA TELEPHONE PRIOR TO TRANSPORT. MULTIPLE STAFF TO MOVE PT
OVER TO BED FROM STRETCHER.

## 2022-10-01 NOTE — NUR
RECEIVED REPORT/PATIENT FROM ED NURSE, ALINE BRUNNER. PATIENT TRANSPORTED VIA
STRETCHER.  PT IS A&O WITH SOME CONFUSION NOTED.  RESPIRATIONS ARE EVEN AND
UNLABORED ON O2 @ 2L VIA NC. PATIENT COMPLAINTS OF GENERALIZED PAIN.  PENDING
PROVIDER ORDERS.  ORIENTED TO ROOM AND CALL LIGHT.  SAFETY PRECAUTIONS IN
PLACE.

## 2022-10-01 NOTE — NUR
PATIENT RESTING IN BED IN LOW SEMI-ZAMORA'S POSITION, AWAKE.  PATIENT WANTS TO
KNOW WHEN HE CAN GO HOME AND SPEAK WITH THE DOCTOR.  MD NOTIFIED.  PATIENT IS
WATCHING TV. NO OTHER SIGNS OF DISTRESS NOTED NOR VERBALIZED BY PATIENT AT
THIS TIME. BED IN LOWEST POSITION, CALL LIGHT AND BED SIDE TABLE WITHIN REACH.
SAFETY PRECAUSTIONS IN PLACE.

## 2022-10-01 NOTE — NUR
2000: S/P fall  patient found in bed awake and alert but forgetfull and
confused at times. Patient noted with sob on exertion, instructed to
keep oxygen on. Patient also c/o left side pain. no s/s of injury noted. timmons
catheter in place and patent draining yellow urine. Md was notified by day
Nurse regarding the fall.
no new order noted. Patient wife, Heather also notified about the fall. will
continue to monitor.

## 2022-10-01 NOTE — NUR
PT VERY CONFUSED AND DROWSY DUE TO PAIN MEDICATIONS HE TOOK AT HOME. PT UNABLE
TO HOLD WATER BOTTLE OR URINAL ON HIS OWN. PROVIDER AWARE.

## 2022-10-01 NOTE — NUR
Patient was tossing and turning in bed. Patient wanted to get off bed. He is
also c/o  left side pain. fall/safety discussed with patient. Fall/safety in
place. bed alarm on, side rails up x2. call light easy to reach. Patient
medicated with tylenol for pain and xanax for anxiety with good effect.

## 2022-10-01 NOTE — NUR
PT SHOUTING OUT INTO THE HALLWAY TRYING TO CLIMB OUT OF BED. PT REORIENTED AT
THIS TIME AND REPOSITIONED IN BED

## 2022-10-01 NOTE — NUR
PT BACK FROM CT, PT NOT FOLLOWING COMMANDS IN CT SCAN. PT REMAINS ALTERED. PT
HAVING MORE LABORED BREATHHING, DR. GOLDEN AT BEDSIDE.

## 2022-10-02 VITALS — DIASTOLIC BLOOD PRESSURE: 73 MMHG | SYSTOLIC BLOOD PRESSURE: 116 MMHG

## 2022-10-02 VITALS — DIASTOLIC BLOOD PRESSURE: 68 MMHG | SYSTOLIC BLOOD PRESSURE: 137 MMHG

## 2022-10-02 VITALS — SYSTOLIC BLOOD PRESSURE: 137 MMHG | DIASTOLIC BLOOD PRESSURE: 68 MMHG

## 2022-10-02 VITALS — DIASTOLIC BLOOD PRESSURE: 68 MMHG | SYSTOLIC BLOOD PRESSURE: 111 MMHG

## 2022-10-02 VITALS — DIASTOLIC BLOOD PRESSURE: 76 MMHG | SYSTOLIC BLOOD PRESSURE: 124 MMHG

## 2022-10-02 VITALS — SYSTOLIC BLOOD PRESSURE: 131 MMHG | DIASTOLIC BLOOD PRESSURE: 73 MMHG

## 2022-10-02 VITALS — SYSTOLIC BLOOD PRESSURE: 111 MMHG | DIASTOLIC BLOOD PRESSURE: 68 MMHG

## 2022-10-02 VITALS — SYSTOLIC BLOOD PRESSURE: 105 MMHG | DIASTOLIC BLOOD PRESSURE: 64 MMHG

## 2022-10-02 VITALS — DIASTOLIC BLOOD PRESSURE: 83 MMHG | SYSTOLIC BLOOD PRESSURE: 133 MMHG

## 2022-10-02 VITALS — SYSTOLIC BLOOD PRESSURE: 116 MMHG | DIASTOLIC BLOOD PRESSURE: 73 MMHG

## 2022-10-02 LAB
ALBUMIN SERPL-MCNC: 3.1 G/DL (ref 3.2–5)
ALP SERPL-CCNC: 72 U/L (ref 38–126)
ANION GAP SERPL CALCULATED.3IONS-SCNC: 14 MMOL/L
AST SERPL-CCNC: 35 U/L (ref 19–48)
BASOPHILS NFR BLD AUTO: 0 % (ref 0–3)
BUN SERPL-MCNC: 18 MG/DL (ref 8–23)
BUN/CREAT SERPL: 15
CHLORIDE SERPL-SCNC: 101 MMOL/L (ref 95–108)
CO2 SERPL-SCNC: 23 MMOL/L (ref 22–30)
CREAT SERPL-MCNC: 1.2 MG/DL (ref 0.7–1.3)
EOSINOPHIL NFR BLD AUTO: 0 % (ref 0–8)
ERYTHROCYTE [DISTWIDTH] IN BLOOD BY AUTOMATED COUNT: 12.7 % (ref 11.5–15.5)
HCT VFR BLD AUTO: 40.1 % (ref 39–50)
HGB BLD-MCNC: 13.4 G/DL (ref 14–18)
IMM GRANULOCYTES NFR BLD: 0.7 % (ref 0–5)
LYMPHOCYTES NFR BLD: 5 % (ref 15–41)
MAGNESIUM SERPL-MCNC: 1.6 MG/DL (ref 1.6–2.3)
MCH RBC QN AUTO: 32.3 PG  CALC (ref 26–32)
MCHC RBC AUTO-ENTMCNC: 33.4 G/DL CAL (ref 32–36)
MCV RBC AUTO: 96.6 FL  CALC (ref 80–100)
MONOCYTES NFR BLD AUTO: 7 % (ref 2–13)
MYOGLOBIN SERPL-MCNC: 363 NG/ML (ref 0–121)
NEUTROPHILS # BLD AUTO: 14.72 THOU/UL (ref 1.82–7.42)
NEUTROPHILS NFR BLD AUTO: 88 % (ref 42–76)
PLATELET # BLD AUTO: 68 THOU/UL (ref 130–400)
POTASSIUM SERPL-SCNC: 3.9 MMOL/L (ref 3.5–5.1)
PROT SERPL-MCNC: 5.7 G/DL (ref 6.3–8.2)
RBC # BLD AUTO: 4.15 MILL/UL (ref 4.7–6.1)
SODIUM SERPL-SCNC: 134 MMOL/L (ref 137–146)

## 2022-10-02 NOTE — NUR
ABOUT 1940 PT WAS ASSITED TO [facility], AND ASSISTED BACK TO BED. PT SIT AT THE EDGE
OF THE BED TO CATCH HIS BREATH. CNA WAS ABOUT TO TAKE VITAL WHEN PT GET UP AND
LOSE BALANCE AND FELL ON THE BED AND HIT HIS RIGHT SIDE WITH THE FOOT BOARD OF
THE BED. PT ASSISTED TO THE FLOOR AND TRANFER TO BED VIA ELIANA LIFT. NO
INJURIES NOTED.  NOTIFIED.

## 2022-10-03 VITALS — SYSTOLIC BLOOD PRESSURE: 144 MMHG | DIASTOLIC BLOOD PRESSURE: 71 MMHG

## 2022-10-03 VITALS — DIASTOLIC BLOOD PRESSURE: 61 MMHG | SYSTOLIC BLOOD PRESSURE: 112 MMHG

## 2022-10-03 VITALS — SYSTOLIC BLOOD PRESSURE: 105 MMHG | DIASTOLIC BLOOD PRESSURE: 64 MMHG

## 2022-10-03 VITALS — SYSTOLIC BLOOD PRESSURE: 111 MMHG | DIASTOLIC BLOOD PRESSURE: 63 MMHG

## 2022-10-03 VITALS — DIASTOLIC BLOOD PRESSURE: 53 MMHG | SYSTOLIC BLOOD PRESSURE: 116 MMHG

## 2022-10-03 VITALS — SYSTOLIC BLOOD PRESSURE: 103 MMHG | DIASTOLIC BLOOD PRESSURE: 30 MMHG

## 2022-10-03 VITALS — SYSTOLIC BLOOD PRESSURE: 106 MMHG | DIASTOLIC BLOOD PRESSURE: 40 MMHG

## 2022-10-03 LAB
ANION GAP SERPL CALCULATED.3IONS-SCNC: 11 MMOL/L
BUN SERPL-MCNC: 18 MG/DL (ref 8–23)
BUN/CREAT SERPL: 15
CHLORIDE SERPL-SCNC: 102 MMOL/L (ref 95–108)
CO2 SERPL-SCNC: 29 MMOL/L (ref 22–30)
CREAT SERPL-MCNC: 1.3 MG/DL (ref 0.7–1.3)
ERYTHROCYTE [DISTWIDTH] IN BLOOD BY AUTOMATED COUNT: 12.4 % (ref 11.5–15.5)
HCT VFR BLD AUTO: 38.8 % (ref 39–50)
HGB BLD-MCNC: 12.5 G/DL (ref 14–18)
MAGNESIUM SERPL-MCNC: 1.9 MG/DL (ref 1.6–2.3)
MCH RBC QN AUTO: 32 PG  CALC (ref 26–32)
MCHC RBC AUTO-ENTMCNC: 32.2 G/DL CAL (ref 32–36)
MCV RBC AUTO: 99.2 FL  CALC (ref 80–100)
PLATELET # BLD AUTO: 49 THOU/UL (ref 130–400)
POTASSIUM SERPL-SCNC: 3.7 MMOL/L (ref 3.5–5.1)
RBC # BLD AUTO: 3.91 MILL/UL (ref 4.7–6.1)
SODIUM SERPL-SCNC: 138 MMOL/L (ref 137–146)

## 2022-10-03 NOTE — NUR
PT WITH CHEST PAIN THIS AM.  EKG DONE,  GIVEN, NTG X 1.  MINIMAL
IMPROVEMENT.  PT TO CT AND BACK AS PER ELEVATED D-DIMER.

## 2022-10-03 NOTE — NUR
PT HAS BEEN DISCHARGED FROM [facility] TO HOME.  PT'S WIFE ARRIVED WITH CLEAN CLOTHES
AND PT WAS TAKEN TO VEHICLE AFTER VERBALIZING UNDERSTANDING OF DC
INSTRUCTIONS.  PT LEAVES [facility] IN STABLE CONDITION.

## 2022-10-03 NOTE — NUR
PT WANTED TO LEAVE THIS AFTERNOON, BUT WILL STAY.  ROCEPHIN 2  GM INFUSING.
MOY CATHETER REMOVED AS PER DR ROMERO ORDER.

## 2022-10-22 ENCOUNTER — HOSPITAL ENCOUNTER (EMERGENCY)
Dept: HOSPITAL 82 - ED | Age: 72
Discharge: HOME | DRG: 605 | End: 2022-10-22
Payer: COMMERCIAL

## 2022-10-22 VITALS — WEIGHT: 315 LBS | HEIGHT: 71 IN | BODY MASS INDEX: 44.1 KG/M2

## 2022-10-22 VITALS — DIASTOLIC BLOOD PRESSURE: 65 MMHG | SYSTOLIC BLOOD PRESSURE: 124 MMHG

## 2022-10-22 VITALS — DIASTOLIC BLOOD PRESSURE: 67 MMHG | SYSTOLIC BLOOD PRESSURE: 141 MMHG

## 2022-10-22 VITALS — DIASTOLIC BLOOD PRESSURE: 58 MMHG | SYSTOLIC BLOOD PRESSURE: 134 MMHG

## 2022-10-22 VITALS — SYSTOLIC BLOOD PRESSURE: 124 MMHG | DIASTOLIC BLOOD PRESSURE: 65 MMHG
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
## 2022-10-22 DIAGNOSIS — S62.616A: ICD-10-CM

## 2022-10-22 DIAGNOSIS — S00.83XA: Primary | ICD-10-CM

## 2022-10-26 ENCOUNTER — HOSPITAL ENCOUNTER (OUTPATIENT)
Dept: HOSPITAL 82 - ED | Age: 72
Setting detail: OBSERVATION
LOS: 6 days | Discharge: PSYCHIATRIC OR SUBSTANCE USE FACILITY | DRG: 917 | End: 2022-11-01
Attending: INTERNAL MEDICINE | Admitting: INTERNAL MEDICINE
Payer: COMMERCIAL

## 2022-10-26 VITALS — HEIGHT: 71 IN | WEIGHT: 315 LBS | BODY MASS INDEX: 44.1 KG/M2

## 2022-10-26 VITALS — SYSTOLIC BLOOD PRESSURE: 126 MMHG | DIASTOLIC BLOOD PRESSURE: 62 MMHG

## 2022-10-26 VITALS — SYSTOLIC BLOOD PRESSURE: 136 MMHG | DIASTOLIC BLOOD PRESSURE: 54 MMHG

## 2022-10-26 DIAGNOSIS — E78.5: ICD-10-CM

## 2022-10-26 DIAGNOSIS — I50.9: ICD-10-CM

## 2022-10-26 DIAGNOSIS — N39.0: ICD-10-CM

## 2022-10-26 DIAGNOSIS — N40.1: ICD-10-CM

## 2022-10-26 DIAGNOSIS — N13.8: ICD-10-CM

## 2022-10-26 DIAGNOSIS — M48.00: ICD-10-CM

## 2022-10-26 DIAGNOSIS — I25.10: ICD-10-CM

## 2022-10-26 DIAGNOSIS — F13.10: ICD-10-CM

## 2022-10-26 DIAGNOSIS — T40.411A: ICD-10-CM

## 2022-10-26 DIAGNOSIS — Z91.81: ICD-10-CM

## 2022-10-26 DIAGNOSIS — K21.9: ICD-10-CM

## 2022-10-26 DIAGNOSIS — F32.A: ICD-10-CM

## 2022-10-26 DIAGNOSIS — T42.4X1A: ICD-10-CM

## 2022-10-26 DIAGNOSIS — S00.81XA: ICD-10-CM

## 2022-10-26 DIAGNOSIS — G89.29: ICD-10-CM

## 2022-10-26 DIAGNOSIS — F11.20: ICD-10-CM

## 2022-10-26 DIAGNOSIS — E11.65: ICD-10-CM

## 2022-10-26 DIAGNOSIS — Z78.1: ICD-10-CM

## 2022-10-26 DIAGNOSIS — F41.9: ICD-10-CM

## 2022-10-26 DIAGNOSIS — G92.8: ICD-10-CM

## 2022-10-26 DIAGNOSIS — Z20.822: ICD-10-CM
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
## 2022-10-26 DIAGNOSIS — E66.01: ICD-10-CM

## 2022-10-26 DIAGNOSIS — Z95.0: ICD-10-CM

## 2022-10-26 DIAGNOSIS — D69.3: ICD-10-CM

## 2022-10-26 DIAGNOSIS — I11.0: ICD-10-CM

## 2022-10-26 DIAGNOSIS — T40.2X1A: Primary | ICD-10-CM

## 2022-10-26 DIAGNOSIS — S00.83XA: ICD-10-CM

## 2022-10-26 DIAGNOSIS — S80.02XA: ICD-10-CM

## 2022-10-26 DIAGNOSIS — I48.91: ICD-10-CM

## 2022-10-26 PROCEDURE — S0166 INJ OLANZAPINE 2.5MG: HCPCS

## 2022-10-26 PROCEDURE — G0378 HOSPITAL OBSERVATION PER HR: HCPCS

## 2022-10-26 NOTE — NUR
PT COMES IN VIA EMS FOR FALL. MD AT [facility]. PT'S VITAL STABLE, GIVEN CALL
LIGHT. NIH 0.

## 2022-10-27 VITALS — SYSTOLIC BLOOD PRESSURE: 156 MMHG | DIASTOLIC BLOOD PRESSURE: 136 MMHG

## 2022-10-27 VITALS — DIASTOLIC BLOOD PRESSURE: 73 MMHG | SYSTOLIC BLOOD PRESSURE: 131 MMHG

## 2022-10-27 VITALS — DIASTOLIC BLOOD PRESSURE: 78 MMHG | SYSTOLIC BLOOD PRESSURE: 154 MMHG

## 2022-10-27 VITALS — DIASTOLIC BLOOD PRESSURE: 75 MMHG | SYSTOLIC BLOOD PRESSURE: 161 MMHG

## 2022-10-27 VITALS — SYSTOLIC BLOOD PRESSURE: 152 MMHG | DIASTOLIC BLOOD PRESSURE: 95 MMHG

## 2022-10-27 VITALS — DIASTOLIC BLOOD PRESSURE: 109 MMHG | SYSTOLIC BLOOD PRESSURE: 137 MMHG

## 2022-10-27 VITALS — DIASTOLIC BLOOD PRESSURE: 81 MMHG | SYSTOLIC BLOOD PRESSURE: 151 MMHG

## 2022-10-27 VITALS — DIASTOLIC BLOOD PRESSURE: 69 MMHG | SYSTOLIC BLOOD PRESSURE: 130 MMHG

## 2022-10-27 VITALS — SYSTOLIC BLOOD PRESSURE: 128 MMHG | DIASTOLIC BLOOD PRESSURE: 70 MMHG

## 2022-10-27 VITALS — SYSTOLIC BLOOD PRESSURE: 130 MMHG | DIASTOLIC BLOOD PRESSURE: 105 MMHG

## 2022-10-27 VITALS — SYSTOLIC BLOOD PRESSURE: 146 MMHG | DIASTOLIC BLOOD PRESSURE: 74 MMHG

## 2022-10-27 VITALS — SYSTOLIC BLOOD PRESSURE: 130 MMHG | DIASTOLIC BLOOD PRESSURE: 103 MMHG

## 2022-10-27 VITALS — SYSTOLIC BLOOD PRESSURE: 143 MMHG | DIASTOLIC BLOOD PRESSURE: 82 MMHG

## 2022-10-27 VITALS — DIASTOLIC BLOOD PRESSURE: 105 MMHG | SYSTOLIC BLOOD PRESSURE: 154 MMHG

## 2022-10-27 VITALS — SYSTOLIC BLOOD PRESSURE: 164 MMHG | DIASTOLIC BLOOD PRESSURE: 98 MMHG

## 2022-10-27 VITALS — DIASTOLIC BLOOD PRESSURE: 59 MMHG | SYSTOLIC BLOOD PRESSURE: 166 MMHG

## 2022-10-27 VITALS — DIASTOLIC BLOOD PRESSURE: 104 MMHG | SYSTOLIC BLOOD PRESSURE: 144 MMHG

## 2022-10-27 VITALS — SYSTOLIC BLOOD PRESSURE: 107 MMHG | DIASTOLIC BLOOD PRESSURE: 89 MMHG

## 2022-10-27 VITALS — DIASTOLIC BLOOD PRESSURE: 85 MMHG | SYSTOLIC BLOOD PRESSURE: 145 MMHG

## 2022-10-27 VITALS — SYSTOLIC BLOOD PRESSURE: 156 MMHG | DIASTOLIC BLOOD PRESSURE: 78 MMHG

## 2022-10-27 LAB
ALBUMIN SERPL-MCNC: 3.4 G/DL (ref 3.2–5)
ALP SERPL-CCNC: 74 U/L (ref 38–126)
ANION GAP SERPL CALCULATED.3IONS-SCNC: 8 MMOL/L
AST SERPL-CCNC: 25 U/L (ref 19–48)
BACTERIA #/AREA URNS HPF: (no result) HPF
BASOPHILS NFR BLD AUTO: 0 % (ref 0–3)
BILIRUB UR QL STRIP.AUTO: NEGATIVE
BUN SERPL-MCNC: 18 MG/DL (ref 8–23)
BUN/CREAT SERPL: 12
CHLORIDE SERPL-SCNC: 102 MMOL/L (ref 95–108)
CO2 SERPL-SCNC: 30 MMOL/L (ref 22–30)
COLOR UR AUTO: YELLOW
CREAT SERPL-MCNC: 1.5 MG/DL (ref 0.7–1.3)
EOSINOPHIL NFR BLD AUTO: 2 % (ref 0–8)
EPI CELLS URNS QL MICRO: (no result) EPI/HPF
ERYTHROCYTE [DISTWIDTH] IN BLOOD BY AUTOMATED COUNT: 12.4 % (ref 11.5–15.5)
GLUCOSE UR STRIP.AUTO-MCNC: NEGATIVE MG/DL
HCT VFR BLD AUTO: 37.5 % (ref 39–50)
HGB BLD-MCNC: 12.2 G/DL (ref 14–18)
HGB UR QL STRIP.AUTO: (no result)
IMM GRANULOCYTES NFR BLD: 0.7 % (ref 0–5)
KETONES UR STRIP.AUTO-MCNC: NEGATIVE MG/DL
LEUKOCYTE ESTERASE UR QL STRIP.AUTO: NEGATIVE
LYMPHOCYTES NFR BLD: 14 % (ref 15–41)
MCH RBC QN AUTO: 31.7 PG  CALC (ref 26–32)
MCHC RBC AUTO-ENTMCNC: 32.5 G/DL CAL (ref 32–36)
MCV RBC AUTO: 97.4 FL  CALC (ref 80–100)
MONOCYTES NFR BLD AUTO: 9 % (ref 2–13)
MYOGLOBIN SERPL-MCNC: 107 NG/ML (ref 0–121)
NEUTROPHILS # BLD AUTO: 6.39 THOU/UL (ref 1.82–7.42)
NEUTROPHILS NFR BLD AUTO: 74 % (ref 42–76)
NITRITE UR QL STRIP.AUTO: NEGATIVE
PH UR STRIP.AUTO: 6 [PH] (ref 4.5–8)
PLATELET # BLD AUTO: 86 THOU/UL (ref 130–400)
POTASSIUM SERPL-SCNC: 4.3 MMOL/L (ref 3.5–5.1)
PROT SERPL-MCNC: 6.8 G/DL (ref 6.3–8.2)
PROT UR QL STRIP.AUTO: NEGATIVE MG/DL
RBC # BLD AUTO: 3.85 MILL/UL (ref 4.7–6.1)
RBC #/AREA URNS HPF: (no result) RBC/HPF (ref 0–5)
SODIUM SERPL-SCNC: 136 MMOL/L (ref 137–146)
SP GR UR STRIP.AUTO: 1.01
UROBILINOGEN UR QL STRIP.AUTO: 0.2 E.U./DL
WBC #/AREA URNS HPF: (no result) WBC/HPF (ref 0–5)

## 2022-10-27 PROCEDURE — 0T9B70Z DRAINAGE OF BLADDER WITH DRAINAGE DEVICE, VIA NATURAL OR ARTIFICIAL OPENING: ICD-10-PCS | Performed by: INTERNAL MEDICINE

## 2022-10-27 NOTE — NUR
PT YELLING THAT HE WANTS TO LEAVE.  TRYING TO GET OUT OF BED.  SOFT RESTRAINTS
IN PLACE.  PT SAID HE HAD TO HAVE A BM.  PLACED ON BEDPAN.  PT VOIDED ON BP. 
AND ON SHEETS PRIOR.

## 2022-10-27 NOTE — NUR
SPOKE TO WIFE WHO STATES THAT PT HAS BEEN FALLING AT HOME AND SHE CAN'T GET HIM
UP.  PT UNABLE TO EVEN SIT UP IN BED...BUT TRIES TO SCOOT DOWN IN THE BED. 
CONTINUES TO WANT TO BE REMOVED FROM THE SOFT WRIST RESTRAINTS AND GO
HOME...."PROMISES NOT TO FALL."

## 2022-10-27 NOTE — NUR
PT CONTINUED TO TRY AND CRAWL OUT OF THE BED, SEVERAL ATTEMPTS AT
REDIRECTION/COMFORT MEASURES
MADE, ALL ATTEMPTS UNSUCCESSFUL. DR. OBREGON ORDERED SOFT UPPER LIMB RESTRAINTS
AT THIS TIME FOR THE SAFETY OF THE PATIENT.

## 2022-10-27 NOTE — NUR
SHIFT REASSESSMENT - PT REMAINS QUITE ALERT, BUT DISORIENTED. HE IS YELLING
AND INSISTING THAT THE RETRAINTS BE REMOVED. TOSSING HIS LEGS OFF OF THE BED.
WENT BACK TO BEDSIDE AND SAT WITH HIM FOR A LITTE WHILE TO REASSURE HIM. PT
LOOKS TO HAVE SETTLED DOWN. SAFETY PRECAUTIONS IN PLACE. WILL CONTINUE TO
MONITOR.

## 2022-10-27 NOTE — NUR
RC'D REPORT FROM ICU, REPOSITIONED PT, PT IN NO DISTRESS, PT EXTREMELY
CONFUSED, ROOM AIR OXYGEN, VITAL SIGNS STABLE, MEDICATED PER MAR, WILL
CONTINUE TO MONITOR.

## 2022-10-27 NOTE — NUR
PT AGITATED AND ROLLING IN BED. MULTIPLE ATTEMPTS TO REORIENT PT TO SPACE AND
SAFETY. PT REFUSING TO STAY IN BED AT THIS TIME. MD AWARE. MED ORDER RECEIVED

## 2022-10-27 NOTE — NUR
PT LOOKS TO BE DISORIENTED AND RESTRAINED AT THIS TIME. HE IS REQUESTING THAT
HIS RESTRAINS BE REMOVED. PT GIVEN XANAX FOR IS AGGITATION AND PAIN MEDICATION
FOR HIS BACK PAIN. PTS VITAL SIGNS ARE WITHIN NORMAL LIMITS. PT REQUESTED ALL
LIGHTS BE TURNED OFF. PT LOOKS TO BE RESTING COMFORTABLY AT THIS TIME. WILL
CONTINUE TO MONITOR CLOSELY.

## 2022-10-27 NOTE — NUR
PT CONTINUES TO YELL AT STAFF AND HOLLAR ABOUT LEAVING ER.  PT INCONT OF LIQUID
STOOL.  FIGHTING AGAINST STAFF WHILE TRYING TO CLEAN HIM UP.  PLACED ON
HOSPTIAL BED FOR COMFORT.  VSS.

## 2022-10-27 NOTE — NUR
PT CONTINUES TO ATTEMPT TO GET OUT OF BED.  YELLING FOR STAFF TO LET HIM LEAVE.
 DR WAS AT BEDSIDE TO DISCUSS THE RESULTS OF TESTS.  PT MEDICATED FOR SLEEP

## 2022-10-28 VITALS — DIASTOLIC BLOOD PRESSURE: 71 MMHG | SYSTOLIC BLOOD PRESSURE: 144 MMHG

## 2022-10-28 VITALS — DIASTOLIC BLOOD PRESSURE: 90 MMHG | SYSTOLIC BLOOD PRESSURE: 187 MMHG

## 2022-10-28 VITALS — DIASTOLIC BLOOD PRESSURE: 74 MMHG | SYSTOLIC BLOOD PRESSURE: 150 MMHG

## 2022-10-28 VITALS — SYSTOLIC BLOOD PRESSURE: 182 MMHG | DIASTOLIC BLOOD PRESSURE: 86 MMHG

## 2022-10-28 VITALS — SYSTOLIC BLOOD PRESSURE: 144 MMHG | DIASTOLIC BLOOD PRESSURE: 71 MMHG

## 2022-10-28 LAB
ANION GAP SERPL CALCULATED.3IONS-SCNC: 12 MMOL/L
BUN SERPL-MCNC: 12 MG/DL (ref 8–23)
BUN/CREAT SERPL: 11
CHLORIDE SERPL-SCNC: 110 MMOL/L (ref 95–108)
CO2 SERPL-SCNC: 26 MMOL/L (ref 22–30)
CREAT SERPL-MCNC: 1.1 MG/DL (ref 0.7–1.3)
ERYTHROCYTE [DISTWIDTH] IN BLOOD BY AUTOMATED COUNT: 12.6 % (ref 11.5–15.5)
HCT VFR BLD AUTO: 37.7 % (ref 39–50)
HGB BLD-MCNC: 12.5 G/DL (ref 14–18)
MAGNESIUM SERPL-MCNC: 1.6 MG/DL (ref 1.6–2.3)
MCH RBC QN AUTO: 32 PG  CALC (ref 26–32)
MCHC RBC AUTO-ENTMCNC: 33.2 G/DL CAL (ref 32–36)
MCV RBC AUTO: 96.4 FL  CALC (ref 80–100)
PLATELET # BLD AUTO: 95 THOU/UL (ref 130–400)
POTASSIUM SERPL-SCNC: 3.8 MMOL/L (ref 3.5–5.1)
RBC # BLD AUTO: 3.91 MILL/UL (ref 4.7–6.1)
SODIUM SERPL-SCNC: 144 MMOL/L (ref 137–146)

## 2022-10-28 NOTE — NUR
PT RECEIVED THE ZYPREXA BUT STILL NO FULLY ASLEEP AT THIS TIME. PT MAKING
ATTEMPTS TO GET OUT OF BED. RESTRAINTS PLACED BACK ON FOR PTS SAFETY. PT IS IN
AND OUT SLEEP AT THIS TIME.

## 2022-10-28 NOTE — NUR
PT AWAKE ALERT AND ORIENTED RESTING IN BED, RESTRAINTS TO SARA WRISTS IN PLACE
BUT RELEASED TO ALLOW PT TO FEED SELF. PT REQUESTING TO HAVE RESTRAINTS
REMOVED BUT EDUCATED ON REASON FOR THEM. HE IS COMPLIANT AND RATIONAL IN
THINKING AND ACTIONS, ASSISTED TO BSC AND MADE EXTRA LARGE BM. MD NOTIFIED OF
RESTRAINTS OFF, EVALUATED PT AND ORDERED FOR SITTER AS REQUESTED BY NURSE AND
TO DC RESTRAINTS.

## 2022-10-28 NOTE — NUR
BEDSIDE REPORT GIVEN PT SLEEPING IN SUPINE POSITION, BREATHING SHALLOW BUT
NON-LABOED, IVF 0.9 NS INFUSING @ 100 CC/HR TO SITE IN RFA, RESTRAINS IN PLACE
TO SARA WRISTS, MOY IN PLACE DRAINING CLEAR YELLOW URINE, CALL BELL IN REACH
AND BED LOCKED IN LOWEST POSITION.

## 2022-10-28 NOTE — NUR
SHIFT REASSESSMENT - PT FOUND OUT OF HIS RESTRAINTS AND STANDING AT THE FOOT
OF THE BED. SOILED IN STOOL. PT INSISTED ON WALKING INTO THE SHOWER. STARTED
PUSHING STAFF OUT OF THE WAY. PT HIGHLY UNSTEADY.  STAFF WAS ABLE TO CONVINCE
HIM TO GET BACK IN BED WITHOUT INCIDENT. CALLED  AND RECEIVE ORDER TO
GIVE ZYPREXA IM. PT BACK IN BED. RESTAINTS DC'D AT THIS TIME. PT BEING CLOSELY
MONITORED ONE ON ONE WITH CNA AT BEDSIDE.

## 2022-10-28 NOTE — NUR
SHIFT REASSESSMENT - NO CHANGE IN PTS STATUS SINCE PREVIOUS ASSESSMENT. PT
REMAINS AWAKE, BUT DOES NOT LOOK TO BE IN ANY DISRESS.  WILL CONTINUE TO
MONITOR. SAFETY PRECAUTIONS ARE STILL IN PLACE.

## 2022-10-28 NOTE — NUR
PHYSICAL THERAPIST ASSISTED TO RECLINER AND PT SAT UP FOR SHORT TIME THEN
ASKED TO GET BACK IN BED. PT FULLY AWAKE, ALERT AND ORIENTED AT THIS TIME,
RESTRAINS RELEASED FOR PT TO FEED SELF AND BED ALARM ACTIVATED, WILL CONTINUE
TO MONITOR.

## 2022-10-29 VITALS — SYSTOLIC BLOOD PRESSURE: 164 MMHG | DIASTOLIC BLOOD PRESSURE: 79 MMHG

## 2022-10-29 VITALS — SYSTOLIC BLOOD PRESSURE: 147 MMHG | DIASTOLIC BLOOD PRESSURE: 88 MMHG

## 2022-10-29 VITALS — DIASTOLIC BLOOD PRESSURE: 72 MMHG | SYSTOLIC BLOOD PRESSURE: 161 MMHG

## 2022-10-29 VITALS — SYSTOLIC BLOOD PRESSURE: 162 MMHG | DIASTOLIC BLOOD PRESSURE: 81 MMHG

## 2022-10-29 VITALS — DIASTOLIC BLOOD PRESSURE: 85 MMHG | SYSTOLIC BLOOD PRESSURE: 158 MMHG

## 2022-10-29 VITALS — SYSTOLIC BLOOD PRESSURE: 167 MMHG | DIASTOLIC BLOOD PRESSURE: 87 MMHG

## 2022-10-29 VITALS — SYSTOLIC BLOOD PRESSURE: 137 MMHG | DIASTOLIC BLOOD PRESSURE: 74 MMHG

## 2022-10-29 LAB
ANION GAP SERPL CALCULATED.3IONS-SCNC: 11 MMOL/L
BUN SERPL-MCNC: 10 MG/DL (ref 8–23)
BUN/CREAT SERPL: 9
CHLORIDE SERPL-SCNC: 109 MMOL/L (ref 95–108)
CO2 SERPL-SCNC: 27 MMOL/L (ref 22–30)
CREAT SERPL-MCNC: 1.1 MG/DL (ref 0.7–1.3)
ERYTHROCYTE [DISTWIDTH] IN BLOOD BY AUTOMATED COUNT: 12.8 % (ref 11.5–15.5)
HCT VFR BLD AUTO: 40.8 % (ref 39–50)
HGB BLD-MCNC: 13.3 G/DL (ref 14–18)
MCH RBC QN AUTO: 32 PG  CALC (ref 26–32)
MCHC RBC AUTO-ENTMCNC: 32.6 G/DL CAL (ref 32–36)
MCV RBC AUTO: 98.1 FL  CALC (ref 80–100)
PLATELET # BLD AUTO: 130 THOU/UL (ref 130–400)
POTASSIUM SERPL-SCNC: 4 MMOL/L (ref 3.5–5.1)
RBC # BLD AUTO: 4.16 MILL/UL (ref 4.7–6.1)
SODIUM SERPL-SCNC: 143 MMOL/L (ref 137–146)

## 2022-10-29 NOTE — NUR
PT AWAKE AND COMPLETELY ALERT, CARRYING CONVERSATIONS WITH THE STAFF, WATCHING
TV, HAS NOT RECEIVED ANY PAIN MEDICATIONS NOR HAS PT ASKED, WILL CONTINUE TO
MONITOR

## 2022-10-29 NOTE — NUR
PATIENT RESTING IN BED. PATIENT COMPLAINED OF PAIN AND WAS MEDICATED. NO OTHER
COMPLAINTS OR SIGNS OF DISTRESS.

## 2022-10-29 NOTE — NUR
PATIENT RESTLESS, FIDGETING WITH URINARY CATHETER TUBING AND WANTING TO GET UP
FROM BED. PRN XANAX ADMINISTERED AT THIS TIME. SITTER REMAINS AT BEDSIDE.

## 2022-10-30 VITALS — SYSTOLIC BLOOD PRESSURE: 148 MMHG | DIASTOLIC BLOOD PRESSURE: 70 MMHG

## 2022-10-30 VITALS — DIASTOLIC BLOOD PRESSURE: 67 MMHG | SYSTOLIC BLOOD PRESSURE: 152 MMHG

## 2022-10-30 VITALS — SYSTOLIC BLOOD PRESSURE: 138 MMHG | DIASTOLIC BLOOD PRESSURE: 71 MMHG

## 2022-10-30 VITALS — SYSTOLIC BLOOD PRESSURE: 160 MMHG | DIASTOLIC BLOOD PRESSURE: 80 MMHG

## 2022-10-30 VITALS — DIASTOLIC BLOOD PRESSURE: 63 MMHG | SYSTOLIC BLOOD PRESSURE: 131 MMHG

## 2022-10-30 VITALS — SYSTOLIC BLOOD PRESSURE: 119 MMHG | DIASTOLIC BLOOD PRESSURE: 69 MMHG

## 2022-10-30 VITALS — DIASTOLIC BLOOD PRESSURE: 69 MMHG | SYSTOLIC BLOOD PRESSURE: 119 MMHG

## 2022-10-30 LAB
ANION GAP SERPL CALCULATED.3IONS-SCNC: 10 MMOL/L
BASOPHILS NFR BLD AUTO: 1 % (ref 0–3)
BUN SERPL-MCNC: 11 MG/DL (ref 8–23)
BUN/CREAT SERPL: 10
CHLORIDE SERPL-SCNC: 110 MMOL/L (ref 95–108)
CO2 SERPL-SCNC: 24 MMOL/L (ref 22–30)
CREAT SERPL-MCNC: 1.1 MG/DL (ref 0.7–1.3)
EOSINOPHIL NFR BLD AUTO: 4 % (ref 0–8)
ERYTHROCYTE [DISTWIDTH] IN BLOOD BY AUTOMATED COUNT: 12.8 % (ref 11.5–15.5)
HCT VFR BLD AUTO: 37.8 % (ref 39–50)
HGB BLD-MCNC: 12.4 G/DL (ref 14–18)
IMM GRANULOCYTES NFR BLD: 0.6 % (ref 0–5)
LYMPHOCYTES NFR BLD: 29 % (ref 15–41)
MAGNESIUM SERPL-MCNC: 1.5 MG/DL (ref 1.6–2.3)
MCH RBC QN AUTO: 32 PG  CALC (ref 26–32)
MCHC RBC AUTO-ENTMCNC: 32.8 G/DL CAL (ref 32–36)
MCV RBC AUTO: 97.4 FL  CALC (ref 80–100)
MONOCYTES NFR BLD AUTO: 7 % (ref 2–13)
NEUTROPHILS # BLD AUTO: 3.88 THOU/UL (ref 1.82–7.42)
NEUTROPHILS NFR BLD AUTO: 59 % (ref 42–76)
PLATELET # BLD AUTO: 108 THOU/UL (ref 130–400)
POTASSIUM SERPL-SCNC: 3.8 MMOL/L (ref 3.5–5.1)
RBC # BLD AUTO: 3.88 MILL/UL (ref 4.7–6.1)
SODIUM SERPL-SCNC: 141 MMOL/L (ref 137–146)

## 2022-10-30 NOTE — NUR
REPORT RECEIVED FROM NIGHT NURSE. PT RESTING IN BED SEMI FOWLERS; REPOSITIONED
INTO HIGH FOWLERS AND WARM BLANKETS PROVIDED PER PT REQUEST. ALERT AND
ORIENTED X 3. C/O 9/10 PAIN TO BACK OF NECK AND RIGHT 5TH DIGIT FINGER;
SCHEDULED FENTANYL PATCH APPLIED TO RIGHT UPPER ARM AND LORTAB GIVEN.
RESPIRATIONS EVEN AND UNLABORED ON ROOM AIR; LUNG SOUNDS DIMINISHED. 1+
PITTING PEDAL EDEMA; FEET ELEVATED IN BED. BRUISING TO 4TH AND 5TH DIGIT OF
RIGHT HAND. MOY DRIANING CLEAR, YELLOW URINE TO GRAVITY. DECLINED NICOTINE
PATCH STATING THAT HE STOPPED SMOKING YEARS AGO AND DOES NOT WANT IT. PLAN OFC
ARE REVIEWED. PT ENCOURAGED TO VERBALIZE CONCERNS; PT VERBOSE. SAFETY MEASURES
IN PLACE; CALL LIGHT WITHIN REACH.

## 2022-10-30 NOTE — NUR
RECEIEVED REPORT FROM NURSE MACEDO, ASSUMED PATIENT CARE, PATIENT RESTING IN
BED, WARCHING TV AND WAS ON HIS CELLPHONE, NOT IN RESPIRATORY DISTRESS,
PATIENT IS ALERT AMD ORIENTED X 3 ABLE TO MAKE NEEDS KNOWN, WITH  ONGOING IV
OF NS @ RFA @ KVO, REMAINS ON TELEMETRY, ACTIVE BOWEL SOUNDS, PATIENT HAS
INDWELLING MOY CATHETER DRAINING CLEAR YELLOW URINE, NOTED TO HAVE FENTANYL
PATCH AT RIGHT UPPER ARM, C/O PAIN ON NECK AREA PRN ORDER FOR LORTAB, WILL
MEDICATE, CALL LIGHT AT REACH.

## 2022-10-30 NOTE — NUR
PT MORE COOPERATIVE WITH FINGERSTICK BLOOD SUGARS; REQUESTED A XANAX;
ADMINISTERED WITH GOOD EFFECT. NO OTHER REQUESTS OR CONCERNS AT THIS TIME.
[facility] [place] WITHOUT PROBLEMS. CALL LIGHT WITHIN REACH.

## 2022-10-30 NOTE — NUR
PAIN MEDICATION EFFECTIVE FOR NECK AND FINGER PAIN; STATES THAT PAIN IS NOW A
4/10. REPOSITIONED UP INTO CHAIR FOR BREAKFAST. URINE LEAKING AROUND CATHETER;
HYGIENE PROVIDED. WILL ASSESS MOY AFTER BREAKFAST.

## 2022-10-30 NOTE — NUR
Patient sleeping comfortably in bed. Recieved 2000 feeding. No signs of
distress. Patient was assessed and given night medications. Vitals are stable.

## 2022-10-30 NOTE — NUR
DR. TERAN AT BEDSIDE TO DISCUSS PLAN OF CARE; WIFE ALSO AT BEDSIDE FOR DR
KAYLA. PT REPORTS THAT HE AMBULATED TO BATHROOM FOR BOWEL MOVEMENT WITH WIFE
ASSISTANCE; REPOSITIONED HIMSELF BACK IN BED; RESTING IN SEMI FOWLERS.

## 2022-10-31 VITALS — DIASTOLIC BLOOD PRESSURE: 65 MMHG | SYSTOLIC BLOOD PRESSURE: 119 MMHG

## 2022-10-31 VITALS — DIASTOLIC BLOOD PRESSURE: 82 MMHG | SYSTOLIC BLOOD PRESSURE: 146 MMHG

## 2022-10-31 VITALS — DIASTOLIC BLOOD PRESSURE: 81 MMHG | SYSTOLIC BLOOD PRESSURE: 156 MMHG

## 2022-10-31 VITALS — DIASTOLIC BLOOD PRESSURE: 69 MMHG | SYSTOLIC BLOOD PRESSURE: 140 MMHG

## 2022-10-31 VITALS — DIASTOLIC BLOOD PRESSURE: 77 MMHG | SYSTOLIC BLOOD PRESSURE: 145 MMHG

## 2022-10-31 VITALS — SYSTOLIC BLOOD PRESSURE: 120 MMHG | DIASTOLIC BLOOD PRESSURE: 61 MMHG

## 2022-10-31 LAB
ANION GAP SERPL CALCULATED.3IONS-SCNC: 11 MMOL/L
BASOPHILS NFR BLD AUTO: 1 % (ref 0–3)
BUN SERPL-MCNC: 10 MG/DL (ref 8–23)
BUN/CREAT SERPL: 9
CHLORIDE SERPL-SCNC: 110 MMOL/L (ref 95–108)
CO2 SERPL-SCNC: 26 MMOL/L (ref 22–30)
CREAT SERPL-MCNC: 1.1 MG/DL (ref 0.7–1.3)
EOSINOPHIL NFR BLD AUTO: 5 % (ref 0–8)
ERYTHROCYTE [DISTWIDTH] IN BLOOD BY AUTOMATED COUNT: 12.7 % (ref 11.5–15.5)
HCT VFR BLD AUTO: 42.1 % (ref 39–50)
HGB BLD-MCNC: 13.5 G/DL (ref 14–18)
IMM GRANULOCYTES NFR BLD: 0.8 % (ref 0–5)
LYMPHOCYTES NFR BLD: 29 % (ref 15–41)
MAGNESIUM SERPL-MCNC: 1.5 MG/DL (ref 1.6–2.3)
MCH RBC QN AUTO: 31.6 PG  CALC (ref 26–32)
MCHC RBC AUTO-ENTMCNC: 32.1 G/DL CAL (ref 32–36)
MCV RBC AUTO: 98.6 FL  CALC (ref 80–100)
MONOCYTES NFR BLD AUTO: 7 % (ref 2–13)
NEUTROPHILS # BLD AUTO: 3.73 THOU/UL (ref 1.82–7.42)
NEUTROPHILS NFR BLD AUTO: 58 % (ref 42–76)
PLATELET # BLD AUTO: 106 THOU/UL (ref 130–400)
POTASSIUM SERPL-SCNC: 3.9 MMOL/L (ref 3.5–5.1)
RBC # BLD AUTO: 4.27 MILL/UL (ref 4.7–6.1)
SODIUM SERPL-SCNC: 143 MMOL/L (ref 137–146)

## 2022-10-31 NOTE — NUR
PATIENT RESTING IN BED, EYES CLOSED, BREATHING EVEN UNLABORED, PHLEBOTOMIST IN
ROOM CALL LIGHT IN REACH.

## 2022-10-31 NOTE — NUR
pt refused accu check, nurse explain to pt the importance of accu check pt
still refused, and stated thst he is aware of his diabetes.

## 2022-10-31 NOTE — NUR
ASHLY CURRENTLY WATCHING TV NO DISCOMFORTS NOTED AT THIS TIME, BREATHING
UNLABORED, NATASHA;L LIGHT IN REACH.

## 2022-11-01 VITALS — DIASTOLIC BLOOD PRESSURE: 63 MMHG | SYSTOLIC BLOOD PRESSURE: 124 MMHG

## 2022-11-01 VITALS — DIASTOLIC BLOOD PRESSURE: 64 MMHG | SYSTOLIC BLOOD PRESSURE: 125 MMHG

## 2022-11-01 VITALS — DIASTOLIC BLOOD PRESSURE: 74 MMHG | SYSTOLIC BLOOD PRESSURE: 138 MMHG

## 2022-11-01 LAB
ANION GAP SERPL CALCULATED.3IONS-SCNC: 10 MMOL/L
BUN SERPL-MCNC: 13 MG/DL (ref 8–23)
BUN/CREAT SERPL: 10
CHLORIDE SERPL-SCNC: 109 MMOL/L (ref 95–108)
CO2 SERPL-SCNC: 25 MMOL/L (ref 22–30)
CREAT SERPL-MCNC: 1.3 MG/DL (ref 0.7–1.3)
MAGNESIUM SERPL-MCNC: 1.8 MG/DL (ref 1.6–2.3)
POTASSIUM SERPL-SCNC: 4.3 MMOL/L (ref 3.5–5.1)
SODIUM SERPL-SCNC: 140 MMOL/L (ref 137–146)

## 2022-11-01 NOTE — NUR
Patient alert and oriented. Patient expressed he would like his wife to help
him shower when she arrives. Patient has a steady gait and is appropriate for
a shower. Patient educated about reason for stay and medications. Patient
verbalized understanding.

## 2022-11-01 NOTE — NUR
Discharge instructions given. Patient verbalizes understanding of same.
Discharged in stable condition via Wheelchair to Home with
staff. All belongings sent with pt. IV removed and hemostasis obtained.

## 2023-05-02 ENCOUNTER — HOSPITAL ENCOUNTER (EMERGENCY)
Dept: HOSPITAL 82 - ED | Age: 73
Discharge: LEFT BEFORE DISCHARGE | DRG: 65 | End: 2023-05-02
Payer: COMMERCIAL

## 2023-05-02 VITALS — HEIGHT: 71 IN | BODY MASS INDEX: 44.1 KG/M2 | WEIGHT: 315 LBS

## 2023-05-02 VITALS — SYSTOLIC BLOOD PRESSURE: 142 MMHG | DIASTOLIC BLOOD PRESSURE: 78 MMHG

## 2023-05-02 DIAGNOSIS — Z98.1: ICD-10-CM

## 2023-05-02 DIAGNOSIS — Z95.0: ICD-10-CM

## 2023-05-02 DIAGNOSIS — F32.A: ICD-10-CM

## 2023-05-02 DIAGNOSIS — I48.91: ICD-10-CM

## 2023-05-02 DIAGNOSIS — R73.03: ICD-10-CM

## 2023-05-02 DIAGNOSIS — Z79.899: ICD-10-CM

## 2023-05-02 DIAGNOSIS — I25.10: ICD-10-CM

## 2023-05-02 DIAGNOSIS — D69.3: ICD-10-CM

## 2023-05-02 DIAGNOSIS — I10: ICD-10-CM

## 2023-05-02 DIAGNOSIS — K21.9: ICD-10-CM

## 2023-05-02 DIAGNOSIS — R29.810: ICD-10-CM

## 2023-05-02 DIAGNOSIS — R29.704: ICD-10-CM

## 2023-05-02 DIAGNOSIS — E78.5: ICD-10-CM

## 2023-05-02 DIAGNOSIS — F41.9: ICD-10-CM

## 2023-05-02 DIAGNOSIS — R47.81: ICD-10-CM

## 2023-05-02 DIAGNOSIS — Z53.29: ICD-10-CM

## 2023-05-02 DIAGNOSIS — E66.9: ICD-10-CM

## 2023-05-02 DIAGNOSIS — I63.9: Primary | ICD-10-CM

## 2023-05-02 DIAGNOSIS — Z98.84: ICD-10-CM

## 2023-05-02 LAB
ALBUMIN SERPL-MCNC: 3.9 G/DL (ref 3.2–5)
ALP SERPL-CCNC: 119 U/L (ref 38–126)
ANION GAP SERPL CALCULATED.3IONS-SCNC: 14 MMOL/L
AST SERPL-CCNC: 30 U/L (ref 19–48)
BASOPHILS NFR BLD AUTO: 0.4 % (ref 0–3)
BUN SERPL-MCNC: 20 MG/DL (ref 8–23)
BUN/CREAT SERPL: 18
CHLORIDE SERPL-SCNC: 101 MMOL/L (ref 95–108)
CO2 SERPL-SCNC: 27 MMOL/L (ref 22–30)
COLOR UR AUTO: YELLOW
CREAT SERPL-MCNC: 1.1 MG/DL (ref 0.7–1.3)
EOSINOPHIL NFR BLD AUTO: 1 % (ref 0–8)
ERYTHROCYTE [DISTWIDTH] IN BLOOD BY AUTOMATED COUNT: 12.6 % (ref 11.5–15.5)
GLUCOSE UR STRIP.AUTO-MCNC: >=1000 MG/DL
HCT VFR BLD AUTO: 46.6 % (ref 39–50)
HGB BLD-MCNC: 14.3 G/DL (ref 14–18)
HGB UR QL STRIP.AUTO: (no result)
IMM GRANULOCYTES NFR BLD: 0.8 % (ref 0–5)
INR PPP: 1.1 RATIO (ref 0.7–1.3)
KETONES UR STRIP.AUTO-MCNC: (no result) MG/DL
LEUKOCYTE ESTERASE UR QL STRIP.AUTO: NEGATIVE
LYMPHOCYTES NFR BLD: 12.4 % (ref 15–41)
MCH RBC QN AUTO: 30.1 PG  CALC (ref 26–32)
MCHC RBC AUTO-ENTMCNC: 30.7 G/DL CAL (ref 32–36)
MCV RBC AUTO: 98.1 FL  CALC (ref 80–100)
MONOCYTES NFR BLD AUTO: 5.8 % (ref 2–13)
NEUTROPHILS # BLD AUTO: 8.32 THOU/UL (ref 1.82–7.42)
NEUTROPHILS NFR BLD AUTO: 79.6 % (ref 42–76)
NITRITE UR QL STRIP.AUTO: NEGATIVE
PH UR STRIP.AUTO: 6 [PH] (ref 4.5–8)
PLATELET # BLD AUTO: 100 THOU/UL (ref 130–400)
POTASSIUM SERPL-SCNC: 4 MMOL/L (ref 3.5–5.1)
PROT SERPL-MCNC: 7.3 G/DL (ref 6.3–8.2)
PROT UR QL STRIP.AUTO: NEGATIVE MG/DL
PROTHROMBIN TIME: 10.6 SECONDS (ref 9–12.5)
RBC # BLD AUTO: 4.75 MILL/UL (ref 4.7–6.1)
SODIUM SERPL-SCNC: 138 MMOL/L (ref 137–146)
SP GR UR STRIP.AUTO: 1.02
UROBILINOGEN UR QL STRIP.AUTO: 1 E.U./DL

## 2023-05-03 LAB — BILIRUB UR QL STRIP.AUTO: NEGATIVE
